# Patient Record
Sex: FEMALE | Race: OTHER | NOT HISPANIC OR LATINO
[De-identification: names, ages, dates, MRNs, and addresses within clinical notes are randomized per-mention and may not be internally consistent; named-entity substitution may affect disease eponyms.]

---

## 2017-01-25 ENCOUNTER — APPOINTMENT (OUTPATIENT)
Dept: HEART AND VASCULAR | Facility: CLINIC | Age: 82
End: 2017-01-25

## 2017-01-25 VITALS — SYSTOLIC BLOOD PRESSURE: 140 MMHG | DIASTOLIC BLOOD PRESSURE: 90 MMHG

## 2017-01-25 VITALS
HEART RATE: 117 BPM | DIASTOLIC BLOOD PRESSURE: 90 MMHG | SYSTOLIC BLOOD PRESSURE: 162 MMHG | WEIGHT: 102.9 LBS | HEIGHT: 60 IN | OXYGEN SATURATION: 94 % | BODY MASS INDEX: 20.2 KG/M2

## 2017-01-25 DIAGNOSIS — I10 ESSENTIAL (PRIMARY) HYPERTENSION: ICD-10-CM

## 2017-04-20 ENCOUNTER — RX RENEWAL (OUTPATIENT)
Age: 82
End: 2017-04-20

## 2017-06-20 ENCOUNTER — APPOINTMENT (OUTPATIENT)
Dept: HEART AND VASCULAR | Facility: CLINIC | Age: 82
End: 2017-06-20

## 2017-06-20 VITALS
WEIGHT: 101 LBS | HEART RATE: 100 BPM | TEMPERATURE: 98.3 F | SYSTOLIC BLOOD PRESSURE: 148 MMHG | BODY MASS INDEX: 19.83 KG/M2 | OXYGEN SATURATION: 94 % | DIASTOLIC BLOOD PRESSURE: 90 MMHG | HEIGHT: 60 IN

## 2017-07-18 ENCOUNTER — RX RENEWAL (OUTPATIENT)
Age: 82
End: 2017-07-18

## 2017-10-18 ENCOUNTER — RX RENEWAL (OUTPATIENT)
Age: 82
End: 2017-10-18

## 2017-12-26 ENCOUNTER — RX RENEWAL (OUTPATIENT)
Age: 82
End: 2017-12-26

## 2018-01-05 ENCOUNTER — APPOINTMENT (OUTPATIENT)
Dept: HEART AND VASCULAR | Facility: CLINIC | Age: 83
End: 2018-01-05

## 2018-02-12 ENCOUNTER — APPOINTMENT (OUTPATIENT)
Dept: HEART AND VASCULAR | Facility: CLINIC | Age: 83
End: 2018-02-12
Payer: MEDICARE

## 2018-02-12 VITALS — SYSTOLIC BLOOD PRESSURE: 140 MMHG | DIASTOLIC BLOOD PRESSURE: 80 MMHG

## 2018-02-12 VITALS
BODY MASS INDEX: 20.03 KG/M2 | HEIGHT: 60 IN | OXYGEN SATURATION: 95 % | DIASTOLIC BLOOD PRESSURE: 90 MMHG | SYSTOLIC BLOOD PRESSURE: 148 MMHG | TEMPERATURE: 98.6 F | WEIGHT: 102 LBS | HEART RATE: 102 BPM

## 2018-02-12 DIAGNOSIS — I48.91 UNSPECIFIED ATRIAL FIBRILLATION: ICD-10-CM

## 2018-02-12 PROCEDURE — 93000 ELECTROCARDIOGRAM COMPLETE: CPT

## 2018-02-12 PROCEDURE — 99214 OFFICE O/P EST MOD 30 MIN: CPT | Mod: 25

## 2018-04-11 ENCOUNTER — RX RENEWAL (OUTPATIENT)
Age: 83
End: 2018-04-11

## 2018-05-25 ENCOUNTER — RX RENEWAL (OUTPATIENT)
Age: 83
End: 2018-05-25

## 2018-07-10 ENCOUNTER — MEDICATION RENEWAL (OUTPATIENT)
Age: 83
End: 2018-07-10

## 2018-07-30 ENCOUNTER — APPOINTMENT (OUTPATIENT)
Dept: HEART AND VASCULAR | Facility: CLINIC | Age: 83
End: 2018-07-30

## 2018-08-09 ENCOUNTER — APPOINTMENT (OUTPATIENT)
Dept: HEART AND VASCULAR | Facility: CLINIC | Age: 83
End: 2018-08-09

## 2018-08-10 ENCOUNTER — RX RENEWAL (OUTPATIENT)
Age: 83
End: 2018-08-10

## 2018-08-29 ENCOUNTER — APPOINTMENT (OUTPATIENT)
Dept: HEART AND VASCULAR | Facility: CLINIC | Age: 83
End: 2018-08-29
Payer: MEDICARE

## 2018-08-29 VITALS
TEMPERATURE: 98.7 F | WEIGHT: 102 LBS | SYSTOLIC BLOOD PRESSURE: 130 MMHG | DIASTOLIC BLOOD PRESSURE: 80 MMHG | HEART RATE: 100 BPM | BODY MASS INDEX: 20.03 KG/M2 | HEIGHT: 60 IN | OXYGEN SATURATION: 96 % | RESPIRATION RATE: 14 BRPM

## 2018-08-29 DIAGNOSIS — I48.92 UNSPECIFIED ATRIAL FLUTTER: ICD-10-CM

## 2018-08-29 PROCEDURE — 99214 OFFICE O/P EST MOD 30 MIN: CPT | Mod: 25

## 2018-08-29 PROCEDURE — 93000 ELECTROCARDIOGRAM COMPLETE: CPT

## 2018-08-30 ENCOUNTER — APPOINTMENT (OUTPATIENT)
Dept: INTERNAL MEDICINE | Facility: CLINIC | Age: 83
End: 2018-08-30

## 2018-09-04 LAB
ALBUMIN SERPL ELPH-MCNC: 4.6 G/DL
ALP BLD-CCNC: 98 U/L
ALT SERPL-CCNC: 15 U/L
ANION GAP SERPL CALC-SCNC: 15 MMOL/L
AST SERPL-CCNC: 26 U/L
BASOPHILS # BLD AUTO: 0.03 K/UL
BASOPHILS NFR BLD AUTO: 0.4 %
BILIRUB SERPL-MCNC: 0.7 MG/DL
BUN SERPL-MCNC: 24 MG/DL
CALCIUM SERPL-MCNC: 9.2 MG/DL
CHLORIDE SERPL-SCNC: 94 MMOL/L
CHOLEST SERPL-MCNC: 186 MG/DL
CHOLEST/HDLC SERPL: 2.3 RATIO
CO2 SERPL-SCNC: 27 MMOL/L
CREAT SERPL-MCNC: 0.75 MG/DL
EOSINOPHIL # BLD AUTO: 0.06 K/UL
EOSINOPHIL NFR BLD AUTO: 0.7 %
GLUCOSE SERPL-MCNC: 73 MG/DL
HBA1C MFR BLD HPLC: 5.5 %
HCT VFR BLD CALC: 41.4 %
HDLC SERPL-MCNC: 80 MG/DL
HGB BLD-MCNC: 13.5 G/DL
IMM GRANULOCYTES NFR BLD AUTO: 0.2 %
LDLC SERPL CALC-MCNC: 94 MG/DL
LYMPHOCYTES # BLD AUTO: 1.95 K/UL
LYMPHOCYTES NFR BLD AUTO: 23.9 %
MAN DIFF?: NORMAL
MCHC RBC-ENTMCNC: 30.8 PG
MCHC RBC-ENTMCNC: 32.6 GM/DL
MCV RBC AUTO: 94.5 FL
MONOCYTES # BLD AUTO: 0.66 K/UL
MONOCYTES NFR BLD AUTO: 8.1 %
NEUTROPHILS # BLD AUTO: 5.44 K/UL
NEUTROPHILS NFR BLD AUTO: 66.7 %
PLATELET # BLD AUTO: 305 K/UL
POTASSIUM SERPL-SCNC: 4.8 MMOL/L
PROT SERPL-MCNC: 7.7 G/DL
RBC # BLD: 4.38 M/UL
RBC # FLD: 15.4 %
SODIUM SERPL-SCNC: 136 MMOL/L
TRIGL SERPL-MCNC: 59 MG/DL
TSH SERPL-ACNC: 1.86 UIU/ML
WBC # FLD AUTO: 8.16 K/UL

## 2018-09-05 ENCOUNTER — RX RENEWAL (OUTPATIENT)
Age: 83
End: 2018-09-05

## 2018-11-08 ENCOUNTER — RX RENEWAL (OUTPATIENT)
Age: 83
End: 2018-11-08

## 2019-01-18 ENCOUNTER — RX RENEWAL (OUTPATIENT)
Age: 84
End: 2019-01-18

## 2019-02-06 ENCOUNTER — RX RENEWAL (OUTPATIENT)
Age: 84
End: 2019-02-06

## 2019-03-18 ENCOUNTER — INPATIENT (INPATIENT)
Facility: HOSPITAL | Age: 84
LOS: 2 days | Discharge: HOME | DRG: 872 | End: 2019-03-21
Payer: MEDICARE

## 2019-03-18 VITALS
HEART RATE: 86 BPM | WEIGHT: 103.62 LBS | DIASTOLIC BLOOD PRESSURE: 69 MMHG | TEMPERATURE: 99 F | OXYGEN SATURATION: 88 % | RESPIRATION RATE: 18 BRPM | SYSTOLIC BLOOD PRESSURE: 132 MMHG

## 2019-03-18 DIAGNOSIS — D72.829 ELEVATED WHITE BLOOD CELL COUNT, UNSPECIFIED: ICD-10-CM

## 2019-03-18 DIAGNOSIS — Z29.9 ENCOUNTER FOR PROPHYLACTIC MEASURES, UNSPECIFIED: ICD-10-CM

## 2019-03-18 DIAGNOSIS — R05 COUGH: ICD-10-CM

## 2019-03-18 DIAGNOSIS — R53.1 WEAKNESS: ICD-10-CM

## 2019-03-18 DIAGNOSIS — R65.10 SYSTEMIC INFLAMMATORY RESPONSE SYNDROME (SIRS) OF NON-INFECTIOUS ORIGIN WITHOUT ACUTE ORGAN DYSFUNCTION: ICD-10-CM

## 2019-03-18 DIAGNOSIS — E87.1 HYPO-OSMOLALITY AND HYPONATREMIA: ICD-10-CM

## 2019-03-18 DIAGNOSIS — I10 ESSENTIAL (PRIMARY) HYPERTENSION: ICD-10-CM

## 2019-03-18 DIAGNOSIS — R63.8 OTHER SYMPTOMS AND SIGNS CONCERNING FOOD AND FLUID INTAKE: ICD-10-CM

## 2019-03-18 DIAGNOSIS — E03.9 HYPOTHYROIDISM, UNSPECIFIED: ICD-10-CM

## 2019-03-18 DIAGNOSIS — I48.91 UNSPECIFIED ATRIAL FIBRILLATION: ICD-10-CM

## 2019-03-18 DIAGNOSIS — J18.9 PNEUMONIA, UNSPECIFIED ORGANISM: ICD-10-CM

## 2019-03-18 LAB
ALBUMIN SERPL ELPH-MCNC: 3.9 G/DL — SIGNIFICANT CHANGE UP (ref 3.3–5)
ALP SERPL-CCNC: 71 U/L — SIGNIFICANT CHANGE UP (ref 40–120)
ALT FLD-CCNC: 14 U/L — SIGNIFICANT CHANGE UP (ref 10–45)
ANION GAP SERPL CALC-SCNC: 13 MMOL/L — SIGNIFICANT CHANGE UP (ref 5–17)
ANISOCYTOSIS BLD QL: SLIGHT — SIGNIFICANT CHANGE UP
APPEARANCE UR: CLEAR — SIGNIFICANT CHANGE UP
APTT BLD: 33.6 SEC — SIGNIFICANT CHANGE UP (ref 27.5–36.3)
AST SERPL-CCNC: 24 U/L — SIGNIFICANT CHANGE UP (ref 10–40)
BACTERIA # UR AUTO: PRESENT /HPF
BASOPHILS # BLD AUTO: 0 K/UL — SIGNIFICANT CHANGE UP (ref 0–0.2)
BASOPHILS NFR BLD AUTO: 0 % — SIGNIFICANT CHANGE UP (ref 0–2)
BILIRUB SERPL-MCNC: 0.5 MG/DL — SIGNIFICANT CHANGE UP (ref 0.2–1.2)
BILIRUB UR-MCNC: NEGATIVE — SIGNIFICANT CHANGE UP
BUN SERPL-MCNC: 24 MG/DL — HIGH (ref 7–23)
BURR CELLS BLD QL SMEAR: SLIGHT — SIGNIFICANT CHANGE UP
CALCIUM SERPL-MCNC: 8.3 MG/DL — LOW (ref 8.4–10.5)
CHLORIDE SERPL-SCNC: 93 MMOL/L — LOW (ref 96–108)
CO2 SERPL-SCNC: 27 MMOL/L — SIGNIFICANT CHANGE UP (ref 22–31)
COLOR SPEC: YELLOW — SIGNIFICANT CHANGE UP
COMMENT - URINE: SIGNIFICANT CHANGE UP
CREAT SERPL-MCNC: 0.9 MG/DL — SIGNIFICANT CHANGE UP (ref 0.5–1.3)
DACRYOCYTES BLD QL SMEAR: SLIGHT — SIGNIFICANT CHANGE UP
DIFF PNL FLD: NEGATIVE — SIGNIFICANT CHANGE UP
EOSINOPHIL # BLD AUTO: 0 K/UL — SIGNIFICANT CHANGE UP (ref 0–0.5)
EOSINOPHIL NFR BLD AUTO: 0 % — SIGNIFICANT CHANGE UP (ref 0–6)
EPI CELLS # UR: SIGNIFICANT CHANGE UP /HPF (ref 0–5)
FLUAV H1 2009 PAND RNA SPEC QL NAA+PROBE: DETECTED
FLUAV SUBTYP SPEC NAA+PROBE: ABNORMAL
GLUCOSE SERPL-MCNC: 136 MG/DL — HIGH (ref 70–99)
GLUCOSE UR QL: NEGATIVE — SIGNIFICANT CHANGE UP
HCT VFR BLD CALC: 39.4 % — SIGNIFICANT CHANGE UP (ref 34.5–45)
HGB BLD-MCNC: 13.5 G/DL — SIGNIFICANT CHANGE UP (ref 11.5–15.5)
HYALINE CASTS # UR AUTO: SIGNIFICANT CHANGE UP /LPF (ref 0–2)
INR BLD: 1.51 — HIGH (ref 0.88–1.16)
KETONES UR-MCNC: ABNORMAL MG/DL
LACTATE SERPL-SCNC: 1.7 MMOL/L — SIGNIFICANT CHANGE UP (ref 0.5–2)
LEGIONELLA AG UR QL: NEGATIVE — SIGNIFICANT CHANGE UP
LEUKOCYTE ESTERASE UR-ACNC: NEGATIVE — SIGNIFICANT CHANGE UP
LYMPHOCYTES # BLD AUTO: 0.2 K/UL — LOW (ref 1–3.3)
LYMPHOCYTES # BLD AUTO: 1.7 % — LOW (ref 13–44)
MACROCYTES BLD QL: SLIGHT — SIGNIFICANT CHANGE UP
MANUAL SMEAR VERIFICATION: SIGNIFICANT CHANGE UP
MCHC RBC-ENTMCNC: 31.3 PG — SIGNIFICANT CHANGE UP (ref 27–34)
MCHC RBC-ENTMCNC: 34.3 GM/DL — SIGNIFICANT CHANGE UP (ref 32–36)
MCV RBC AUTO: 91.4 FL — SIGNIFICANT CHANGE UP (ref 80–100)
MONOCYTES # BLD AUTO: 0.63 K/UL — SIGNIFICANT CHANGE UP (ref 0–0.9)
MONOCYTES NFR BLD AUTO: 5.2 % — SIGNIFICANT CHANGE UP (ref 2–14)
NEUTROPHILS # BLD AUTO: 11.21 K/UL — HIGH (ref 1.8–7.4)
NEUTROPHILS NFR BLD AUTO: 34.8 % — LOW (ref 43–77)
NEUTS BAND # BLD: 58.3 % — HIGH (ref 0–8)
NITRITE UR-MCNC: NEGATIVE — SIGNIFICANT CHANGE UP
OSMOLALITY UR: 473 MOSMOL/KG — SIGNIFICANT CHANGE UP (ref 100–650)
OVALOCYTES BLD QL SMEAR: SLIGHT — SIGNIFICANT CHANGE UP
PH UR: 5.5 — SIGNIFICANT CHANGE UP (ref 5–8)
PLAT MORPH BLD: NORMAL — SIGNIFICANT CHANGE UP
PLATELET # BLD AUTO: 216 K/UL — SIGNIFICANT CHANGE UP (ref 150–400)
POLYCHROMASIA BLD QL SMEAR: SLIGHT — SIGNIFICANT CHANGE UP
POTASSIUM SERPL-MCNC: 3.8 MMOL/L — SIGNIFICANT CHANGE UP (ref 3.5–5.3)
POTASSIUM SERPL-SCNC: 3.8 MMOL/L — SIGNIFICANT CHANGE UP (ref 3.5–5.3)
PROT SERPL-MCNC: 7.6 G/DL — SIGNIFICANT CHANGE UP (ref 6–8.3)
PROT UR-MCNC: 100 MG/DL
PROTHROM AB SERPL-ACNC: 17.3 SEC — HIGH (ref 10–12.9)
RAPID RVP RESULT: DETECTED
RBC # BLD: 4.31 M/UL — SIGNIFICANT CHANGE UP (ref 3.8–5.2)
RBC # FLD: 14.2 % — SIGNIFICANT CHANGE UP (ref 10.3–14.5)
RBC BLD AUTO: ABNORMAL
RBC CASTS # UR COMP ASSIST: < 5 /HPF — SIGNIFICANT CHANGE UP
SODIUM SERPL-SCNC: 133 MMOL/L — LOW (ref 135–145)
SODIUM UR-SCNC: 20 MMOL/L — SIGNIFICANT CHANGE UP
SP GR SPEC: >=1.03 — SIGNIFICANT CHANGE UP (ref 1–1.03)
SPHEROCYTES BLD QL SMEAR: SLIGHT — SIGNIFICANT CHANGE UP
UROBILINOGEN FLD QL: 0.2 E.U./DL — SIGNIFICANT CHANGE UP
WBC # BLD: 12.04 K/UL — HIGH (ref 3.8–10.5)
WBC # FLD AUTO: 12.04 K/UL — HIGH (ref 3.8–10.5)
WBC UR QL: < 5 /HPF — SIGNIFICANT CHANGE UP

## 2019-03-18 PROCEDURE — 93010 ELECTROCARDIOGRAM REPORT: CPT

## 2019-03-18 PROCEDURE — 99285 EMERGENCY DEPT VISIT HI MDM: CPT | Mod: 25

## 2019-03-18 PROCEDURE — 71046 X-RAY EXAM CHEST 2 VIEWS: CPT | Mod: 26

## 2019-03-18 PROCEDURE — 99223 1ST HOSP IP/OBS HIGH 75: CPT | Mod: GC

## 2019-03-18 RX ORDER — AZITHROMYCIN 500 MG/1
500 TABLET, FILM COATED ORAL EVERY 24 HOURS
Qty: 0 | Refills: 0 | Status: DISCONTINUED | OUTPATIENT
Start: 2019-03-18 | End: 2019-03-19

## 2019-03-18 RX ORDER — DILTIAZEM HCL 120 MG
300 CAPSULE, EXT RELEASE 24 HR ORAL DAILY
Qty: 0 | Refills: 0 | Status: DISCONTINUED | OUTPATIENT
Start: 2019-03-18 | End: 2019-03-21

## 2019-03-18 RX ORDER — SODIUM CHLORIDE 9 MG/ML
1450 INJECTION INTRAMUSCULAR; INTRAVENOUS; SUBCUTANEOUS ONCE
Qty: 0 | Refills: 0 | Status: COMPLETED | OUTPATIENT
Start: 2019-03-18 | End: 2019-03-18

## 2019-03-18 RX ORDER — LEVOTHYROXINE SODIUM 125 MCG
125 TABLET ORAL DAILY
Qty: 0 | Refills: 0 | Status: DISCONTINUED | OUTPATIENT
Start: 2019-03-18 | End: 2019-03-21

## 2019-03-18 RX ORDER — RALOXIFENE HYDROCHLORIDE 60 MG/1
60 TABLET, COATED ORAL DAILY
Qty: 0 | Refills: 0 | Status: DISCONTINUED | OUTPATIENT
Start: 2019-03-18 | End: 2019-03-21

## 2019-03-18 RX ORDER — APIXABAN 2.5 MG/1
2.5 TABLET, FILM COATED ORAL EVERY 12 HOURS
Qty: 0 | Refills: 0 | Status: DISCONTINUED | OUTPATIENT
Start: 2019-03-18 | End: 2019-03-21

## 2019-03-18 RX ORDER — ACETAMINOPHEN 500 MG
650 TABLET ORAL ONCE
Qty: 0 | Refills: 0 | Status: COMPLETED | OUTPATIENT
Start: 2019-03-18 | End: 2019-03-18

## 2019-03-18 RX ORDER — CEFTRIAXONE 500 MG/1
1 INJECTION, POWDER, FOR SOLUTION INTRAMUSCULAR; INTRAVENOUS EVERY 24 HOURS
Qty: 0 | Refills: 0 | Status: DISCONTINUED | OUTPATIENT
Start: 2019-03-18 | End: 2019-03-19

## 2019-03-18 RX ADMIN — AZITHROMYCIN 255 MILLIGRAM(S): 500 TABLET, FILM COATED ORAL at 19:17

## 2019-03-18 RX ADMIN — APIXABAN 2.5 MILLIGRAM(S): 2.5 TABLET, FILM COATED ORAL at 18:57

## 2019-03-18 RX ADMIN — Medication 30 MILLIGRAM(S): at 22:22

## 2019-03-18 RX ADMIN — CEFTRIAXONE 100 GRAM(S): 500 INJECTION, POWDER, FOR SOLUTION INTRAMUSCULAR; INTRAVENOUS at 18:29

## 2019-03-18 RX ADMIN — SODIUM CHLORIDE 1450 MILLILITER(S): 9 INJECTION INTRAMUSCULAR; INTRAVENOUS; SUBCUTANEOUS at 17:05

## 2019-03-18 RX ADMIN — Medication 650 MILLIGRAM(S): at 16:27

## 2019-03-18 NOTE — ED ADULT TRIAGE NOTE - CHIEF COMPLAINT QUOTE
Patient, speaking in full sentences, complaining of decreased PO intake for two days, cough and generalized weakness.  Patient denies any CP, SOB, dizziness, N/V/D or any other complaints at this time.   Patient noted to be 88% O2 on RA, EKG in progress.

## 2019-03-18 NOTE — ED ADULT NURSE NOTE - NSIMPLEMENTINTERV_GEN_ALL_ED
Implemented All Fall with Harm Risk Interventions:  [place] to call system. Call bell, personal items and telephone within reach. Instruct patient to call for assistance. Room bathroom lighting operational. Non-slip footwear when patient is off stretcher. Physically safe environment: no spills, clutter or unnecessary equipment. Stretcher in lowest position, wheels locked, appropriate side rails in place. Provide visual cue, wrist band, yellow gown, etc. Monitor gait and stability. Monitor for mental status changes and reorient to person, place, and time. Review medications for side effects contributing to fall risk. Reinforce activity limits and safety measures with patient and family. Provide visual clues: red socks.

## 2019-03-18 NOTE — H&P ADULT - NSICDXPASTSURGICALHX_GEN_ALL_CORE_FT
PAST SURGICAL HISTORY:  History of total hip replacement S/P hip replacement    Other postprocedural status S/P thyroidectomy

## 2019-03-18 NOTE — ED PROVIDER NOTE - PHYSICAL EXAMINATION
VITAL SIGNS: I have reviewed nursing notes and confirm.  CONSTITUTIONAL: Well-developed; well-nourished; in no acute distress. NC in place  SKIN: Skin is warm and dry, no acute rash.  HEAD: Normocephalic; atraumatic.  EYES:  EOM intact; conjunctiva and sclera clear.  ENT: No nasal discharge; airway clear.  NECK: Supple; Voluntary FROM  CARD: No rubs appreciated, Regular rate and rhythm.  RESP: No wheezes, no rales. No respiratory distress  ABD: Soft; non-distended; non-tender; no rebound or guarding  EXT: Normal ROM. No cyanosis or edema.  NEURO: Alert, oriented. Grossly unremarkable.  PSYCH: Cooperative, appropriate.

## 2019-03-18 NOTE — H&P ADULT - NSHPPHYSICALEXAM_GEN_ALL_CORE
.  VITAL SIGNS:  T(C): 36.7 (03-18-19 @ 17:23), Max: 38.7 (03-18-19 @ 15:51)  T(F): 98.1 (03-18-19 @ 17:23), Max: 101.7 (03-18-19 @ 15:51)  HR: 98 (03-18-19 @ 17:23) (82 - 98)  BP: 134/72 (03-18-19 @ 17:23) (112/65 - 134/72)  BP(mean): --  RR: 22 (03-18-19 @ 17:23) (18 - 22)  SpO2: 98% (03-18-19 @ 17:23) (88% - 98%)  Wt(kg): --    PHYSICAL EXAM:    Constitutional: WDWN elderly female , resting comfortably in bed; NAD  Head: NC/AT  Eyes: PERRL, EOMI, anicteric sclera  ENT: no nasal discharge; uvula midline, no oropharyngeal erythema or exudates; MMM  Neck: supple; no JVD or thyromegaly  Respiratory: mild ronchi on the LLL, no wheezing  Cardiac: +S1/S2; RRR; no M/R/G; PMI non-displaced  Gastrointestinal: abdomen soft, NT/ND; no rebound or guarding; +BSx4  Extremities: WWP, no clubbing or cyanosis; no peripheral edema  Vascular: 2+ radial, femoral, DP/PT pulses B/L  Dermatologic: skin warm, dry and intact; no rashes, wounds, or scars  Neurologic: AAOx3; CNII-XII grossly intact; no focal deficits  Psychiatric: affect and characteristics of appearance, verbalizations, behaviors are appropriate

## 2019-03-18 NOTE — ED ADULT NURSE NOTE - PMH
Atrial fibrillation  Atrial fibrillation  Essential hypertension  HTN (hypertension)  Malignant neoplasm of thyroid gland  Thyroid ca

## 2019-03-18 NOTE — H&P ADULT - ASSESSMENT
Pt is a 89 y/o F hx of  Afib (on Eliquis), CAD, HTN, asthma and hypothyroid p/w cough and weakness x 2 days admitted to [facility] for PNA and being treated for CAP. Pt is a 89 y/o F hx of  Afib (on Eliquis), CAD, HTN, asthma and hypothyroid p/w cough and weakness x 2 days admitted to [facility] due to sepsis 2/2 PNA and being treated for CAP. Pt is a 89 y/o F hx of  Afib (on Eliquis), CAD, HTN, asthma and hypothyroid p/w cough and weakness x 2 days admitted to [facility] due to sepsis 2/2 PNA

## 2019-03-18 NOTE — H&P ADULT - NSHPLABSRESULTS_GEN_ALL_CORE
.  LABS:                         13.5   12.04 )-----------( 216      ( 18 Mar 2019 15:29 )             39.4     03-18    133<L>  |  93<L>  |  24<H>  ----------------------------<  136<H>  3.8   |  27  |  0.90    Ca    8.3<L>      18 Mar 2019 15:29    TPro  7.6  /  Alb  3.9  /  TBili  0.5  /  DBili  x   /  AST  24  /  ALT  14  /  AlkPhos  71  03-18    PT/INR - ( 18 Mar 2019 15:29 )   PT: 17.3 sec;   INR: 1.51          PTT - ( 18 Mar 2019 15:29 )  PTT:33.6 sec    CARDIAC MARKERS ( 18 Mar 2019 15:29 )  x     / <0.01 ng/mL / x     / x     / x            Lactate, Blood: 1.7 mmoL/L (03-18 @ 15:29)      RADIOLOGY, EKG & ADDITIONAL TESTS: Reviewed.

## 2019-03-18 NOTE — H&P ADULT - NSICDXPASTMEDICALHX_GEN_ALL_CORE_FT
PAST MEDICAL HISTORY:  Atrial fibrillation Atrial fibrillation    Essential hypertension HTN (hypertension)    Malignant neoplasm of thyroid gland Thyroid ca

## 2019-03-18 NOTE — ED ADULT NURSE NOTE - OBJECTIVE STATEMENT
Patient endorses for the past two days patient has generalized weakness and nonproductive cough, patient denies chest pain, nausea, vomiting or diarrhea.

## 2019-03-18 NOTE — H&P ADULT - NSHPREVIEWOFSYSTEMS_GEN_ALL_CORE
REVIEW OF SYSTEMS:    CONSTITUTIONAL: + weakness, fevers and chills  EYES/ENT: No visual changes;  No vertigo or throat pain   NECK: No pain or stiffness  RESPIRATORY: + cough, no wheezing, hemoptysis; + shortness of breath  CARDIOVASCULAR: No chest pain or palpitations  GASTROINTESTINAL: No abdominal or epigastric pain. No nausea, vomiting, or hematemesis; No diarrhea or constipation. No melena or hematochezia.  GENITOURINARY: No dysuria, frequency or hematuria  NEUROLOGICAL: No numbness or weakness  SKIN: No itching, burning, rashes, or lesions   All other review of systems is negative unless indicated above.

## 2019-03-18 NOTE — H&P ADULT - ATTENDING COMMENTS
a/  fever /cough -- no definite infiltrate appreciated on cxr. scant rales at right base on exam.   r/o viral syndrome  sepsis  CAD  afib  htn  chronic asthma  hyponatremia    p/  check RVP + urine legionella  s/p levoflox in ED  can start ceftriax/azithro if viral studies return negative  ivfs  trend na levels  cont apixiban  nebs prn  cont enalipril  cont synthroid

## 2019-03-18 NOTE — H&P ADULT - HISTORY OF PRESENT ILLNESS
Pt is a 89 y/o F hx of  Afib (on Eliquis), CAD, HTN, asthma and hypothyroid p/w cough and weakness x 2 days. Pt was in her usual state of health on Saturday (316/19) and woke up the next morning with cough , fatigue, and chills. Pt states she has intermittent productive cough with yellow sputum. Pt also reports feeling weak and having decreased appetite. She denies any incident of falling. Pt denies any sick contacts or recent travel history. Reports that she has received the pneumonia vaccine and flu shot this year. Pt endorses mild shortness of breathe but denies any CP, Palpitations, N/V, Diarrhea, urinary frequency, dysuria, abdominal pain, melena, hematochezia, or hemoptysis.     In the ED vitals: rectal T 101.7, HR 82, /65, RR 18, 96% on supplemental O2. CXR with no obvious signs of infiltrate but pending official read. Pt received 1.45L NS bolus, tylenol and Levaquin. Pt was admitted to [facility] due to suspected PNA. Pt is a 89 y/o F hx of  Afib (on Eliquis), CAD, HTN, asthma and hypothyroid p/w cough and weakness x 2 days. Pt was in her usual state of health on Saturday (316/19) and woke up the next morning with cough , fatigue, and chills. Pt states she has intermittent productive cough with yellow sputum. Pt also reports feeling weak and having decreased appetite. She denies any incident of falling. Pt denies any sick contacts or recent travel history. Reports that she has received the pneumonia vaccine and flu shot this year. Pt endorses mild shortness of breathe but denies any CP, Palpitations, N/V, Diarrhea, urinary frequency, dysuria, abdominal pain, melena, hematochezia, or hemoptysis.     In the ED vitals: rectal T 101.7, HR 82, /65, RR 18, 96% on supplemental O2. CXR with possible haziness on RLL but pending official read. Pt received 1.45L NS bolus, tylenol and Levaquin. Pt was admitted to [facility] due to sepsis 2/2 suspected PNA.

## 2019-03-18 NOTE — ED PROVIDER NOTE - OBJECTIVE STATEMENT
89F pmh afib, CAD, HTN, hypothyroid p/w generalized fatigue, weakness, cough for past 2 days, with fever, chills at home. No cp, no sob, no n/v, no abd pain, no diarrhea. No unilateral leg swelling. Taking all medications as directed.   Was well saturday night.

## 2019-03-18 NOTE — H&P ADULT - NSICDXPROBLEM_GEN_ALL_CORE_FT
PROBLEM DIAGNOSES  Problem: Systemic inflammatory response syndrome (SIRS)  Assessment and Plan: Pt presenting with T of 101.7 rectal and Leukocytosis and 58% bands with no source of infection at this time. Possibly 2/2 to PNA vs URI vs UTI(no obvious source at the time). Most likely PNA due to pt's history of cough.   - s/p 1.45 L NS bolus in ED   - start Ceftriaxone 1g Q24H and Azithromycin 500mg Q24H for CAP  - f/u UA and Urine cx  - f/u RVP  - f/u Blood cx  - CXR with no signs of infiltrate  - trend WBC     Problem: Hyponatremia  Assessment and Plan: Pt with hyponatremia of 133 on admission possibly 2/2 to dehydration in the setting of  poor PO intake  - f/u serum osmolality  - f/u urine lytes     Problem: Cough  Assessment and Plan: Pt with cough weakness and chills, found to be febrile likely in the setting of PNA. CXR with no obvious conslidation, pending official read.   - treatment as per above     Problem: General weakness  Assessment and Plan: Weakness in the setting of infection, dehydration, and poor PO intake. Pt is mentating well and has 5/5 strength in UE and LE b/l.   - treatment for PNA as per above      Problem: Leukocytosis  Assessment and Plan:     Problem: Atrial fibrillation  Assessment and Plan: Pt with a hx of Afib  - c/w Elqiuis 2.5 mg BID   - pt follows with Dr. Mosqueda (cardiology)     Problem: Essential hypertension  Assessment and Plan: Pt with a hx of HTN; currently normotensive   - c/w Enalapril 10mg     Problem: Hypothyroid  Assessment and Plan: Pt with hx of thyroid malignancy and now hypothyroid.  - c/w Synthroid .125 mg QD     Problem: Prophylactic measure  Assessment and Plan:     Problem: Nutrition, metabolism, and development symptoms  Assessment and Plan: F; no IVF for now   E K > 4 Mg > 2  N: DASH diet PROBLEM DIAGNOSES  Problem: Sepsis due to pneumonia  Assessment and Plan: Pt presenting with T of 101.7 rectal and Leukocytosis and 58% bands with possibe developing RLL infiltrate.   - s/p 1.45 L NS bolus in ED   - start Ceftriaxone 1g Q24H and Azithromycin 500mg Q24H for CAP  -- f/u RVP  - f/u UA and Urine cx  - f/u Blood cx  - CXR with no signs of infiltrate  - trend WBC     Problem: Hyponatremia  Assessment and Plan: Pt with hyponatremia of 133 on admission possibly 2/2 to dehydration in the setting of  poor PO intake  - f/u serum osmolality  - f/u urine lytes     Problem: Cough  Assessment and Plan: Pt with cough weakness and chills, found to be febrile likely in the setting of PNA. CXR with no obvious conslidation, pending official read.   - treatment as per above     Problem: General weakness  Assessment and Plan: Weakness in the setting of infection, dehydration, and poor PO intake. Pt is mentating well and has 5/5 strength in UE and LE b/l.   - treatment for PNA as per above      Problem: Leukocytosis  Assessment and Plan:     Problem: Atrial fibrillation  Assessment and Plan: Pt with a hx of Afib  - c/w Elqiuis 2.5 mg BID   - pt follows with Dr. Mosqueda (cardiology)     Problem: Essential hypertension  Assessment and Plan: Pt with a hx of HTN; currently normotensive   - c/w Enalapril 10mg     Problem: Hypothyroid  Assessment and Plan: Pt with hx of thyroid malignancy and now hypothyroid.  - c/w Synthroid .125 mg QD     Problem: Prophylactic measure  Assessment and Plan:     Problem: Nutrition, metabolism, and development symptoms  Assessment and Plan: F; no IVF for now   E K > 4 Mg > 2  N: DASH diet PROBLEM DIAGNOSES  Problem: Sepsis due to pneumonia  Assessment and Plan: Pt presenting with T of 101.7 rectal and Leukocytosis and 58% bands with possibe developing RLL infiltrate.   - s/p 1.45 L NS bolus in ED   - hold off on abx until viral studies are back   -- f/u RVP  - f/u UA and Urine cx  - f/u Blood cx  - CXR with no signs of infiltrate  - trend WBC     Problem: Hyponatremia  Assessment and Plan: Pt with hyponatremia of 133 on admission possibly 2/2 to dehydration in the setting of  poor PO intake  - f/u serum osmolality  - f/u urine lytes     Problem: Cough  Assessment and Plan: Pt with cough weakness and chills, found to be febrile likely in the setting of PNA. CXR with no obvious conslidation, pending official read.   - treatment as per above     Problem: General weakness  Assessment and Plan: Weakness in the setting of infection, dehydration, and poor PO intake. Pt is mentating well and has 5/5 strength in UE and LE b/l.   - treatment for PNA as per above      Problem: Leukocytosis  Assessment and Plan:     Problem: Atrial fibrillation  Assessment and Plan: Pt with a hx of Afib  - c/w Elqiuis 2.5 mg BID   - pt follows with Dr. Mosqueda (cardiology)     Problem: Essential hypertension  Assessment and Plan: Pt with a hx of HTN; currently normotensive   - c/w Enalapril 10mg     Problem: Hypothyroid  Assessment and Plan: Pt with hx of thyroid malignancy and now hypothyroid.  - c/w Synthroid .125 mg QD     Problem: Prophylactic measure  Assessment and Plan:     Problem: Nutrition, metabolism, and development symptoms  Assessment and Plan: F; no IVF for now   E K > 4 Mg > 2  N: DASH diet

## 2019-03-18 NOTE — ED PROVIDER NOTE - CLINICAL SUMMARY MEDICAL DECISION MAKING FREE TEXT BOX
89F pmh afib, CAD, HTN, hypothyroid p/w generalized fatigue, weakness, cough for past 2 days, with fever, chills at home. No cp, no sob, no n/v, no abd pain, no diarrhea. No unilateral leg swelling. Concern pna, hypoxia, sepsis, dehdyration, less likely acs or pe. EKG not acutely ischemic. luekocytosis and bandemia noted. abx ordered, IVF,

## 2019-03-18 NOTE — ED ADULT NURSE NOTE - PSH
History of total hip replacement  S/P hip replacement  Other postprocedural status  S/P thyroidectomy

## 2019-03-19 DIAGNOSIS — J10.1 INFLUENZA DUE TO OTHER IDENTIFIED INFLUENZA VIRUS WITH OTHER RESPIRATORY MANIFESTATIONS: ICD-10-CM

## 2019-03-19 DIAGNOSIS — A41.9 SEPSIS, UNSPECIFIED ORGANISM: ICD-10-CM

## 2019-03-19 DIAGNOSIS — Z91.89 OTHER SPECIFIED PERSONAL RISK FACTORS, NOT ELSEWHERE CLASSIFIED: ICD-10-CM

## 2019-03-19 LAB
ANION GAP SERPL CALC-SCNC: 9 MMOL/L — SIGNIFICANT CHANGE UP (ref 5–17)
BUN SERPL-MCNC: 25 MG/DL — HIGH (ref 7–23)
CALCIUM SERPL-MCNC: 7.8 MG/DL — LOW (ref 8.4–10.5)
CHLORIDE SERPL-SCNC: 96 MMOL/L — SIGNIFICANT CHANGE UP (ref 96–108)
CO2 SERPL-SCNC: 27 MMOL/L — SIGNIFICANT CHANGE UP (ref 22–31)
CREAT SERPL-MCNC: 0.74 MG/DL — SIGNIFICANT CHANGE UP (ref 0.5–1.3)
GLUCOSE SERPL-MCNC: 140 MG/DL — HIGH (ref 70–99)
HCT VFR BLD CALC: 38 % — SIGNIFICANT CHANGE UP (ref 34.5–45)
HGB BLD-MCNC: 12.2 G/DL — SIGNIFICANT CHANGE UP (ref 11.5–15.5)
MAGNESIUM SERPL-MCNC: 1.8 MG/DL — SIGNIFICANT CHANGE UP (ref 1.6–2.6)
MCHC RBC-ENTMCNC: 30 PG — SIGNIFICANT CHANGE UP (ref 27–34)
MCHC RBC-ENTMCNC: 32.1 GM/DL — SIGNIFICANT CHANGE UP (ref 32–36)
MCV RBC AUTO: 93.6 FL — SIGNIFICANT CHANGE UP (ref 80–100)
NRBC # BLD: 0 /100 WBCS — SIGNIFICANT CHANGE UP (ref 0–0)
OSMOLALITY SERPL: 279 MOSM/KG — LOW (ref 280–301)
PHOSPHATE SERPL-MCNC: 3.5 MG/DL — SIGNIFICANT CHANGE UP (ref 2.5–4.5)
PLATELET # BLD AUTO: 194 K/UL — SIGNIFICANT CHANGE UP (ref 150–400)
POTASSIUM SERPL-MCNC: 3.6 MMOL/L — SIGNIFICANT CHANGE UP (ref 3.5–5.3)
POTASSIUM SERPL-SCNC: 3.6 MMOL/L — SIGNIFICANT CHANGE UP (ref 3.5–5.3)
RBC # BLD: 4.06 M/UL — SIGNIFICANT CHANGE UP (ref 3.8–5.2)
RBC # FLD: 14.6 % — HIGH (ref 10.3–14.5)
SODIUM SERPL-SCNC: 132 MMOL/L — LOW (ref 135–145)
WBC # BLD: 10.51 K/UL — HIGH (ref 3.8–10.5)
WBC # FLD AUTO: 10.51 K/UL — HIGH (ref 3.8–10.5)

## 2019-03-19 PROCEDURE — 99233 SBSQ HOSP IP/OBS HIGH 50: CPT | Mod: GC

## 2019-03-19 PROCEDURE — 71045 X-RAY EXAM CHEST 1 VIEW: CPT | Mod: 26

## 2019-03-19 RX ORDER — ACETYLCYSTEINE 200 MG/ML
4 VIAL (ML) MISCELLANEOUS EVERY 8 HOURS
Qty: 0 | Refills: 0 | Status: DISCONTINUED | OUTPATIENT
Start: 2019-03-19 | End: 2019-03-19

## 2019-03-19 RX ORDER — IPRATROPIUM/ALBUTEROL SULFATE 18-103MCG
3 AEROSOL WITH ADAPTER (GRAM) INHALATION EVERY 4 HOURS
Qty: 0 | Refills: 0 | Status: DISCONTINUED | OUTPATIENT
Start: 2019-03-19 | End: 2019-03-21

## 2019-03-19 RX ORDER — BENZOCAINE AND MENTHOL 5; 1 G/100ML; G/100ML
1 LIQUID ORAL EVERY 6 HOURS
Qty: 0 | Refills: 0 | Status: DISCONTINUED | OUTPATIENT
Start: 2019-03-19 | End: 2019-03-21

## 2019-03-19 RX ORDER — IPRATROPIUM/ALBUTEROL SULFATE 18-103MCG
3 AEROSOL WITH ADAPTER (GRAM) INHALATION ONCE
Qty: 0 | Refills: 0 | Status: COMPLETED | OUTPATIENT
Start: 2019-03-19 | End: 2019-03-19

## 2019-03-19 RX ORDER — SODIUM CHLORIDE 9 MG/ML
250 INJECTION INTRAMUSCULAR; INTRAVENOUS; SUBCUTANEOUS ONCE
Qty: 0 | Refills: 0 | Status: COMPLETED | OUTPATIENT
Start: 2019-03-19 | End: 2019-03-19

## 2019-03-19 RX ORDER — ACETAMINOPHEN 500 MG
650 TABLET ORAL EVERY 6 HOURS
Qty: 0 | Refills: 0 | Status: DISCONTINUED | OUTPATIENT
Start: 2019-03-19 | End: 2019-03-21

## 2019-03-19 RX ORDER — POTASSIUM CHLORIDE 20 MEQ
40 PACKET (EA) ORAL ONCE
Qty: 0 | Refills: 0 | Status: COMPLETED | OUTPATIENT
Start: 2019-03-19 | End: 2019-03-19

## 2019-03-19 RX ORDER — MAGNESIUM SULFATE 500 MG/ML
2 VIAL (ML) INJECTION ONCE
Qty: 0 | Refills: 0 | Status: COMPLETED | OUTPATIENT
Start: 2019-03-19 | End: 2019-03-19

## 2019-03-19 RX ADMIN — Medication 50 GRAM(S): at 13:38

## 2019-03-19 RX ADMIN — Medication 650 MILLIGRAM(S): at 03:41

## 2019-03-19 RX ADMIN — Medication 3 MILLILITER(S): at 02:21

## 2019-03-19 RX ADMIN — BENZOCAINE AND MENTHOL 1 LOZENGE: 5; 1 LIQUID ORAL at 13:37

## 2019-03-19 RX ADMIN — APIXABAN 2.5 MILLIGRAM(S): 2.5 TABLET, FILM COATED ORAL at 17:36

## 2019-03-19 RX ADMIN — Medication 40 MILLIEQUIVALENT(S): at 13:38

## 2019-03-19 RX ADMIN — Medication 650 MILLIGRAM(S): at 17:37

## 2019-03-19 RX ADMIN — RALOXIFENE HYDROCHLORIDE 60 MILLIGRAM(S): 60 TABLET, COATED ORAL at 11:15

## 2019-03-19 RX ADMIN — Medication 3 MILLILITER(S): at 17:37

## 2019-03-19 RX ADMIN — Medication 300 MILLIGRAM(S): at 06:38

## 2019-03-19 RX ADMIN — Medication 30 MILLIGRAM(S): at 21:41

## 2019-03-19 RX ADMIN — Medication 3 MILLILITER(S): at 03:44

## 2019-03-19 RX ADMIN — Medication 4 MILLILITER(S): at 06:38

## 2019-03-19 RX ADMIN — Medication 3 MILLILITER(S): at 06:39

## 2019-03-19 RX ADMIN — Medication 10 MILLIGRAM(S): at 06:39

## 2019-03-19 RX ADMIN — Medication 125 MICROGRAM(S): at 06:39

## 2019-03-19 RX ADMIN — Medication 3 MILLILITER(S): at 00:41

## 2019-03-19 RX ADMIN — Medication 3 MILLILITER(S): at 11:14

## 2019-03-19 RX ADMIN — APIXABAN 2.5 MILLIGRAM(S): 2.5 TABLET, FILM COATED ORAL at 07:28

## 2019-03-19 RX ADMIN — Medication 3 MILLILITER(S): at 21:41

## 2019-03-19 RX ADMIN — BENZOCAINE AND MENTHOL 1 LOZENGE: 5; 1 LIQUID ORAL at 17:37

## 2019-03-19 NOTE — PROGRESS NOTE ADULT - SUBJECTIVE AND OBJECTIVE BOX
SUBJECTIVE / INTERVAL HPI: Patient reports feeling short of breath during the night and was placed on supplemental O2. Patient seen and examined at bedside.     VITAL SIGNS:  Vital Signs Last 24 Hrs  T(C): 37.1 (19 Mar 2019 09:09), Max: 38.7 (18 Mar 2019 15:51)  T(F): 98.7 (19 Mar 2019 09:09), Max: 101.7 (18 Mar 2019 15:51)  HR: 84 (19 Mar 2019 09:09) (72 - 107)  BP: 106/64 (19 Mar 2019 09:09) (106/64 - 151/77)  BP(mean): --  RR: 18 (19 Mar 2019 09:09) (18 - 22)  SpO2: 93% (19 Mar 2019 09:09) (88% - 98%)    PHYSICAL EXAM:    General: well appearing, resting comfortably in bed and in no acute distress. Able to speak in full sentences but appears more winded with prolonged conversation  HEENT: normocephalic, atraumatic, PERRL, anicteric sclera; no conjunctival pallor, mucus membranes moist  Neck: supple, no masses  Cardiovascular: +S1/S2, regular rate and rhythm; no murmurs, no rub, no gallop  Respiratory: Upper airway sounds that clear with cough,  no wheeze, no rales  Gastrointestinal: soft, active bowel sounds, non-tender, non-distended  Extremities: Warm, dry; no edema, clubbing or cyanosis  Vascular: 2+ radial, DP pulses bilaterally  Neurological: AAOx3; no focal deficits    MEDICATIONS:  MEDICATIONS  (STANDING):  acetylcysteine 20%  Inhalation 4 milliLiter(s) Inhalation every 8 hours  ALBUTerol/ipratropium for Nebulization 3 milliLiter(s) Nebulizer every 4 hours  apixaban 2.5 milliGRAM(s) Oral every 12 hours  diltiazem    milliGRAM(s) Oral daily  enalapril 10 milliGRAM(s) Oral daily  levothyroxine 125 MICROGram(s) Oral daily  oseltamivir 30 milliGRAM(s) Oral every 24 hours  raloxifene 60 milliGRAM(s) Oral daily    MEDICATIONS  (PRN):  acetaminophen   Tablet .. 650 milliGRAM(s) Oral every 6 hours PRN Temp greater or equal to 38C (100.4F)      ALLERGIES:  Allergies    No Known Allergies    Intolerances        LABS:                        12.2   10.51 )-----------( 194      ( 19 Mar 2019 06:35 )             38.0     03-19    132<L>  |  96  |  25<H>  ----------------------------<  140<H>  3.6   |  27  |  0.74    Ca    7.8<L>      19 Mar 2019 06:35  Phos  3.5     03-19  Mg     1.8     03-19    TPro  7.6  /  Alb  3.9  /  TBili  0.5  /  DBili  x   /  AST  24  /  ALT  14  /  AlkPhos  71  03-18    PT/INR - ( 18 Mar 2019 15:29 )   PT: 17.3 sec;   INR: 1.51          PTT - ( 18 Mar 2019 15:29 )  PTT:33.6 sec  Urinalysis Basic - ( 18 Mar 2019 20:05 )    Color: Yellow / Appearance: Clear / SG: >=1.030 / pH: x  Gluc: x / Ketone: Trace mg/dL  / Bili: Negative / Urobili: 0.2 E.U./dL   Blood: x / Protein: 100 mg/dL / Nitrite: NEGATIVE   Leuk Esterase: NEGATIVE / RBC: < 5 /HPF / WBC < 5 /HPF   Sq Epi: x / Non Sq Epi: 0-5 /HPF / Bacteria: Present /HPF      CAPILLARY BLOOD GLUCOSE          RADIOLOGY & ADDITIONAL TESTS: Reviewed. SUBJECTIVE / INTERVAL HPI: Patient reports feeling short of breath during the night and was placed on supplemental O2. Patient seen and examined at bedside.     VITAL SIGNS:  Vital Signs Last 24 Hrs  T(C): 37.1 (19 Mar 2019 09:09), Max: 38.7 (18 Mar 2019 15:51)  T(F): 98.7 (19 Mar 2019 09:09), Max: 101.7 (18 Mar 2019 15:51)  HR: 84 (19 Mar 2019 09:09) (72 - 107)  BP: 106/64 (19 Mar 2019 09:09) (106/64 - 151/77)  BP(mean): --  RR: 18 (19 Mar 2019 09:09) (18 - 22)  SpO2: 93% (19 Mar 2019 09:09) (88% - 98%)    PHYSICAL EXAM:    General: well appearing, resting comfortably in bed and in no acute distress. Able to speak in full sentences but appears more winded with prolonged conversation  HEENT: normocephalic, atraumatic, PERRL, anicteric sclera; no conjunctival pallor, mucus membranes moist  Neck: supple, no masses  Cardiovascular: +S1/S2, regular rate and rhythm; no murmurs, no rub, no gallop  Respiratory: Upper airway sounds that clear with cough, mild rhonchi diffusely no wheeze, no rales  Gastrointestinal: soft, active bowel sounds, non-tender, non-distended  Extremities: Warm, dry; no edema, clubbing or cyanosis  Vascular: 2+ radial, DP pulses bilaterally  Neurological: AAOx3; no focal deficits    MEDICATIONS:  MEDICATIONS  (STANDING):  acetylcysteine 20%  Inhalation 4 milliLiter(s) Inhalation every 8 hours  ALBUTerol/ipratropium for Nebulization 3 milliLiter(s) Nebulizer every 4 hours  apixaban 2.5 milliGRAM(s) Oral every 12 hours  diltiazem    milliGRAM(s) Oral daily  enalapril 10 milliGRAM(s) Oral daily  levothyroxine 125 MICROGram(s) Oral daily  oseltamivir 30 milliGRAM(s) Oral every 24 hours  raloxifene 60 milliGRAM(s) Oral daily    MEDICATIONS  (PRN):  acetaminophen   Tablet .. 650 milliGRAM(s) Oral every 6 hours PRN Temp greater or equal to 38C (100.4F)      ALLERGIES:  Allergies    No Known Allergies    Intolerances        LABS:                        12.2   10.51 )-----------( 194      ( 19 Mar 2019 06:35 )             38.0     03-19    132<L>  |  96  |  25<H>  ----------------------------<  140<H>  3.6   |  27  |  0.74    Ca    7.8<L>      19 Mar 2019 06:35  Phos  3.5     03-19  Mg     1.8     03-19    TPro  7.6  /  Alb  3.9  /  TBili  0.5  /  DBili  x   /  AST  24  /  ALT  14  /  AlkPhos  71  03-18    PT/INR - ( 18 Mar 2019 15:29 )   PT: 17.3 sec;   INR: 1.51          PTT - ( 18 Mar 2019 15:29 )  PTT:33.6 sec  Urinalysis Basic - ( 18 Mar 2019 20:05 )    Color: Yellow / Appearance: Clear / SG: >=1.030 / pH: x  Gluc: x / Ketone: Trace mg/dL  / Bili: Negative / Urobili: 0.2 E.U./dL   Blood: x / Protein: 100 mg/dL / Nitrite: NEGATIVE   Leuk Esterase: NEGATIVE / RBC: < 5 /HPF / WBC < 5 /HPF   Sq Epi: x / Non Sq Epi: 0-5 /HPF / Bacteria: Present /HPF      CAPILLARY BLOOD GLUCOSE          RADIOLOGY & ADDITIONAL TESTS: Reviewed. Off Service Note:  Hospital Course:     SUBJECTIVE / INTERVAL HPI: Patient reports feeling short of breath during the night and was placed on supplemental O2. Patient seen and examined at bedside. Patient reports feeling like she is able to breathe better on O2 but notes some sore throat. Cough is not productive at this time. Otherwise: (-) fever, (-) chills, (-) dizziness, (-) headache, (-) neck pain, (-) chest pain, (-) palpitations, (-)SOB, (-) nausea, (-) vomiting, (-) diarrhea, (-) abdominal pain, (-)new weakness, (-) paresthesias.        VITAL SIGNS:  Vital Signs Last 24 Hrs  T(C): 37.1 (19 Mar 2019 09:09), Max: 38.7 (18 Mar 2019 15:51)  T(F): 98.7 (19 Mar 2019 09:09), Max: 101.7 (18 Mar 2019 15:51)  HR: 84 (19 Mar 2019 09:09) (72 - 107)  BP: 106/64 (19 Mar 2019 09:09) (106/64 - 151/77)  BP(mean): --  RR: 18 (19 Mar 2019 09:09) (18 - 22)  SpO2: 93% (19 Mar 2019 09:09) (88% - 98%)    PHYSICAL EXAM:    General: well appearing, resting comfortably in bed and in no acute distress. Able to speak in full sentences but appears more winded with prolonged conversation  HEENT: normocephalic, atraumatic, PERRL, anicteric sclera; no conjunctival pallor, mucus membranes moist  Neck: supple, no masses  Cardiovascular: +S1/S2, regular rate and rhythm; no murmurs, no rub, no gallop  Respiratory: Upper airway sounds that clear with cough, mild rhonchi diffusely no wheeze, no rales  Gastrointestinal: soft, active bowel sounds, non-tender, non-distended  Extremities: Warm, dry; no edema, clubbing or cyanosis  Vascular: 2+ radial, DP pulses bilaterally  Neurological: AAOx3; no focal deficits    MEDICATIONS:  MEDICATIONS  (STANDING):  acetylcysteine 20%  Inhalation 4 milliLiter(s) Inhalation every 8 hours  ALBUTerol/ipratropium for Nebulization 3 milliLiter(s) Nebulizer every 4 hours  apixaban 2.5 milliGRAM(s) Oral every 12 hours  diltiazem    milliGRAM(s) Oral daily  enalapril 10 milliGRAM(s) Oral daily  levothyroxine 125 MICROGram(s) Oral daily  oseltamivir 30 milliGRAM(s) Oral every 24 hours  raloxifene 60 milliGRAM(s) Oral daily    MEDICATIONS  (PRN):  acetaminophen   Tablet .. 650 milliGRAM(s) Oral every 6 hours PRN Temp greater or equal to 38C (100.4F)      ALLERGIES:  Allergies    No Known Allergies    Intolerances        LABS:                        12.2   10.51 )-----------( 194      ( 19 Mar 2019 06:35 )             38.0     03-19    132<L>  |  96  |  25<H>  ----------------------------<  140<H>  3.6   |  27  |  0.74    Ca    7.8<L>      19 Mar 2019 06:35  Phos  3.5     03-19  Mg     1.8     03-19    TPro  7.6  /  Alb  3.9  /  TBili  0.5  /  DBili  x   /  AST  24  /  ALT  14  /  AlkPhos  71  03-18    PT/INR - ( 18 Mar 2019 15:29 )   PT: 17.3 sec;   INR: 1.51          PTT - ( 18 Mar 2019 15:29 )  PTT:33.6 sec  Urinalysis Basic - ( 18 Mar 2019 20:05 )    Color: Yellow / Appearance: Clear / SG: >=1.030 / pH: x  Gluc: x / Ketone: Trace mg/dL  / Bili: Negative / Urobili: 0.2 E.U./dL   Blood: x / Protein: 100 mg/dL / Nitrite: NEGATIVE   Leuk Esterase: NEGATIVE / RBC: < 5 /HPF / WBC < 5 /HPF   Sq Epi: x / Non Sq Epi: 0-5 /HPF / Bacteria: Present /HPF      CAPILLARY BLOOD GLUCOSE          RADIOLOGY & ADDITIONAL TESTS: Reviewed. Off Service Note:  Hospital Course:   89F w/ PMH HTN, AFib (on Eliquis), CAD, asthma, and hypothyroidism presenting w/ 2 day history of intermittently productive cough and weakness found to have A.     SUBJECTIVE / INTERVAL HPI: Patient reports feeling short of breath during the night and was placed on supplemental O2. Patient seen and examined at bedside. Patient reports feeling like she is able to breathe better on O2 but notes some sore throat. Cough is not productive at this time. Otherwise: (-) fever, (-) chills, (-) dizziness, (-) headache, (-) neck pain, (-) chest pain, (-) palpitations, (-)SOB, (-) nausea, (-) vomiting, (-) diarrhea, (-) abdominal pain, (-)new weakness, (-) paresthesias.        VITAL SIGNS:  Vital Signs Last 24 Hrs  T(C): 37.1 (19 Mar 2019 09:09), Max: 38.7 (18 Mar 2019 15:51)  T(F): 98.7 (19 Mar 2019 09:09), Max: 101.7 (18 Mar 2019 15:51)  HR: 84 (19 Mar 2019 09:09) (72 - 107)  BP: 106/64 (19 Mar 2019 09:09) (106/64 - 151/77)  BP(mean): --  RR: 18 (19 Mar 2019 09:09) (18 - 22)  SpO2: 93% (19 Mar 2019 09:09) (88% - 98%)    PHYSICAL EXAM:    General: well appearing, resting comfortably in bed and in no acute distress. Able to speak in full sentences but appears more winded with prolonged conversation  HEENT: normocephalic, atraumatic, PERRL, anicteric sclera; no conjunctival pallor, mucus membranes moist  Neck: supple, no masses  Cardiovascular: +S1/S2, regular rate and rhythm; no murmurs, no rub, no gallop  Respiratory: Upper airway sounds that clear with cough, mild rhonchi diffusely no wheeze, no rales  Gastrointestinal: soft, active bowel sounds, non-tender, non-distended  Extremities: Warm, dry; no edema, clubbing or cyanosis  Vascular: 2+ radial, DP pulses bilaterally  Neurological: AAOx3; no focal deficits    MEDICATIONS:  MEDICATIONS  (STANDING):  acetylcysteine 20%  Inhalation 4 milliLiter(s) Inhalation every 8 hours  ALBUTerol/ipratropium for Nebulization 3 milliLiter(s) Nebulizer every 4 hours  apixaban 2.5 milliGRAM(s) Oral every 12 hours  diltiazem    milliGRAM(s) Oral daily  enalapril 10 milliGRAM(s) Oral daily  levothyroxine 125 MICROGram(s) Oral daily  oseltamivir 30 milliGRAM(s) Oral every 24 hours  raloxifene 60 milliGRAM(s) Oral daily    MEDICATIONS  (PRN):  acetaminophen   Tablet .. 650 milliGRAM(s) Oral every 6 hours PRN Temp greater or equal to 38C (100.4F)      ALLERGIES:  Allergies    No Known Allergies    Intolerances        LABS:                        12.2   10.51 )-----------( 194      ( 19 Mar 2019 06:35 )             38.0     03-19    132<L>  |  96  |  25<H>  ----------------------------<  140<H>  3.6   |  27  |  0.74    Ca    7.8<L>      19 Mar 2019 06:35  Phos  3.5     03-19  Mg     1.8     03-19    TPro  7.6  /  Alb  3.9  /  TBili  0.5  /  DBili  x   /  AST  24  /  ALT  14  /  AlkPhos  71  03-18    PT/INR - ( 18 Mar 2019 15:29 )   PT: 17.3 sec;   INR: 1.51          PTT - ( 18 Mar 2019 15:29 )  PTT:33.6 sec  Urinalysis Basic - ( 18 Mar 2019 20:05 )    Color: Yellow / Appearance: Clear / SG: >=1.030 / pH: x  Gluc: x / Ketone: Trace mg/dL  / Bili: Negative / Urobili: 0.2 E.U./dL   Blood: x / Protein: 100 mg/dL / Nitrite: NEGATIVE   Leuk Esterase: NEGATIVE / RBC: < 5 /HPF / WBC < 5 /HPF   Sq Epi: x / Non Sq Epi: 0-5 /HPF / Bacteria: Present /HPF      CAPILLARY BLOOD GLUCOSE          RADIOLOGY & ADDITIONAL TESTS: Reviewed. Off Service Note:  Hospital Course:   89F w/ PMH HTN, AFib (on Eliquis), CAD, asthma, and hypothyroidism presenting w/ 2 day history of intermittently productive cough and weakness found to have sepsis secondary to flu A. Patient was treated with Tamiflu. Patient also found to be hyponatremic likely due to poor PO intake. Patient was given NS IVF with improvement.     SUBJECTIVE / INTERVAL HPI: Patient reports feeling short of breath during the night and was placed on supplemental O2. Patient seen and examined at bedside. Patient reports feeling like she is able to breathe better on O2 but notes some sore throat. Cough is not productive at this time. Otherwise: (-) fever, (-) chills, (-) dizziness, (-) headache, (-) neck pain, (-) chest pain, (-) palpitations, (-)SOB, (-) nausea, (-) vomiting, (-) diarrhea, (-) abdominal pain, (-)new weakness, (-) paresthesias.        VITAL SIGNS:  Vital Signs Last 24 Hrs  T(C): 37.1 (19 Mar 2019 09:09), Max: 38.7 (18 Mar 2019 15:51)  T(F): 98.7 (19 Mar 2019 09:09), Max: 101.7 (18 Mar 2019 15:51)  HR: 84 (19 Mar 2019 09:09) (72 - 107)  BP: 106/64 (19 Mar 2019 09:09) (106/64 - 151/77)  BP(mean): --  RR: 18 (19 Mar 2019 09:09) (18 - 22)  SpO2: 93% (19 Mar 2019 09:09) (88% - 98%)    PHYSICAL EXAM:    General: well appearing, resting comfortably in bed and in no acute distress. Able to speak in full sentences but appears more winded with prolonged conversation  HEENT: normocephalic, atraumatic, PERRL, anicteric sclera; no conjunctival pallor, mucus membranes moist  Neck: supple, no masses  Cardiovascular: +S1/S2, regular rate and rhythm; no murmurs, no rub, no gallop  Respiratory: Upper airway sounds that clear with cough, mild rhonchi diffusely no wheeze, no rales  Gastrointestinal: soft, active bowel sounds, non-tender, non-distended  Extremities: Warm, dry; no edema, clubbing or cyanosis  Vascular: 2+ radial, DP pulses bilaterally  Neurological: AAOx3; no focal deficits    MEDICATIONS:  MEDICATIONS  (STANDING):  acetylcysteine 20%  Inhalation 4 milliLiter(s) Inhalation every 8 hours  ALBUTerol/ipratropium for Nebulization 3 milliLiter(s) Nebulizer every 4 hours  apixaban 2.5 milliGRAM(s) Oral every 12 hours  diltiazem    milliGRAM(s) Oral daily  enalapril 10 milliGRAM(s) Oral daily  levothyroxine 125 MICROGram(s) Oral daily  oseltamivir 30 milliGRAM(s) Oral every 24 hours  raloxifene 60 milliGRAM(s) Oral daily    MEDICATIONS  (PRN):  acetaminophen   Tablet .. 650 milliGRAM(s) Oral every 6 hours PRN Temp greater or equal to 38C (100.4F)      ALLERGIES:  Allergies    No Known Allergies    Intolerances        LABS:                        12.2   10.51 )-----------( 194      ( 19 Mar 2019 06:35 )             38.0     03-19    132<L>  |  96  |  25<H>  ----------------------------<  140<H>  3.6   |  27  |  0.74    Ca    7.8<L>      19 Mar 2019 06:35  Phos  3.5     03-19  Mg     1.8     03-19    TPro  7.6  /  Alb  3.9  /  TBili  0.5  /  DBili  x   /  AST  24  /  ALT  14  /  AlkPhos  71  03-18    PT/INR - ( 18 Mar 2019 15:29 )   PT: 17.3 sec;   INR: 1.51          PTT - ( 18 Mar 2019 15:29 )  PTT:33.6 sec  Urinalysis Basic - ( 18 Mar 2019 20:05 )    Color: Yellow / Appearance: Clear / SG: >=1.030 / pH: x  Gluc: x / Ketone: Trace mg/dL  / Bili: Negative / Urobili: 0.2 E.U./dL   Blood: x / Protein: 100 mg/dL / Nitrite: NEGATIVE   Leuk Esterase: NEGATIVE / RBC: < 5 /HPF / WBC < 5 /HPF   Sq Epi: x / Non Sq Epi: 0-5 /HPF / Bacteria: Present /HPF      CAPILLARY BLOOD GLUCOSE          RADIOLOGY & ADDITIONAL TESTS: Reviewed. Off Service Note:  Hospital Course:   89F w/ PMH HTN, AFib (on Eliquis), CAD, asthma, and hypothyroidism presenting w/ 2 day history of intermittently productive cough and weakness found to have sepsis secondary to flu A. Patient was treated with Tamiflu and supportive care. Patient given mucinex and cepacol to assist with expectoration and sore throat. Patient also found to be hyponatremic likely due to hypovolemia given urine osm 479 and serum osm 279 secondary to poor PO intake. Patient was given NS IVF with improvement.     SUBJECTIVE / INTERVAL HPI: Patient reports feeling short of breath during the night and was placed on supplemental O2. Patient seen and examined at bedside. Patient reports feeling like she is able to breathe better on O2 but notes some sore throat. Cough is not productive at this time. Otherwise: (-) fever, (-) chills, (-) dizziness, (-) headache, (-) neck pain, (-) chest pain, (-) palpitations, (-)SOB, (-) nausea, (-) vomiting, (-) diarrhea, (-) abdominal pain, (-)new weakness, (-) paresthesias.        VITAL SIGNS:  Vital Signs Last 24 Hrs  T(C): 37.1 (19 Mar 2019 09:09), Max: 38.7 (18 Mar 2019 15:51)  T(F): 98.7 (19 Mar 2019 09:09), Max: 101.7 (18 Mar 2019 15:51)   HR: 84 (19 Mar 2019 09:09) (72 - 107)  BP: 106/64 (19 Mar 2019 09:09) (106/64 - 151/77)  BP(mean): --  RR: 18 (19 Mar 2019 09:09) (18 - 22)  SpO2: 93% (19 Mar 2019 09:09) (88% - 98%)    PHYSICAL EXAM:    General: well appearing, resting comfortably in bed and in no acute distress. Able to speak in full sentences but appears more winded with prolonged conversation  HEENT: normocephalic, atraumatic, PERRL, anicteric sclera; no conjunctival pallor, mucus membranes moist  Neck: supple, no masses  Cardiovascular: +S1/S2, regular rate and rhythm; no murmurs, no rub, no gallop  Respiratory: Upper airway sounds that clear with cough, mild rhonchi diffusely no wheeze, no rales  Gastrointestinal: soft, active bowel sounds, non-tender, non-distended  Extremities: Warm, dry; no edema, clubbing or cyanosis, capillary refill < 2 seconds  Vascular: 2+ radial, DP pulses bilaterally  Neurological: AAOx3; no focal deficits    MEDICATIONS:  MEDICATIONS  (STANDING):  acetylcysteine 20%  Inhalation 4 milliLiter(s) Inhalation every 8 hours  ALBUTerol/ipratropium for Nebulization 3 milliLiter(s) Nebulizer every 4 hours  apixaban 2.5 milliGRAM(s) Oral every 12 hours  diltiazem    milliGRAM(s) Oral daily  enalapril 10 milliGRAM(s) Oral daily  levothyroxine 125 MICROGram(s) Oral daily  oseltamivir 30 milliGRAM(s) Oral every 24 hours  raloxifene 60 milliGRAM(s) Oral daily    MEDICATIONS  (PRN):  acetaminophen   Tablet .. 650 milliGRAM(s) Oral every 6 hours PRN Temp greater or equal to 38C (100.4F)      ALLERGIES:  Allergies    No Known Allergies    Intolerances        LABS:                        12.2   10.51 )-----------( 194      ( 19 Mar 2019 06:35 )             38.0     03-19    132<L>  |  96  |  25<H>  ----------------------------<  140<H>  3.6   |  27  |  0.74    Ca    7.8<L>      19 Mar 2019 06:35  Phos  3.5     03-19  Mg     1.8     03-19    TPro  7.6  /  Alb  3.9  /  TBili  0.5  /  DBili  x   /  AST  24  /  ALT  14  /  AlkPhos  71  03-18    PT/INR - ( 18 Mar 2019 15:29 )   PT: 17.3 sec;   INR: 1.51          PTT - ( 18 Mar 2019 15:29 )  PTT:33.6 sec  Urinalysis Basic - ( 18 Mar 2019 20:05 )    Color: Yellow / Appearance: Clear / SG: >=1.030 / pH: x  Gluc: x / Ketone: Trace mg/dL  / Bili: Negative / Urobili: 0.2 E.U./dL   Blood: x / Protein: 100 mg/dL / Nitrite: NEGATIVE   Leuk Esterase: NEGATIVE / RBC: < 5 /HPF / WBC < 5 /HPF   Sq Epi: x / Non Sq Epi: 0-5 /HPF / Bacteria: Present /HPF      CAPILLARY BLOOD GLUCOSE          RADIOLOGY & ADDITIONAL TESTS: Reviewed.

## 2019-03-19 NOTE — PROGRESS NOTE ADULT - NSICDXPROBLEM_GEN_ALL_CORE_FT
PROBLEM DIAGNOSES  Problem: Influenza A  Assessment and Plan: Pt presenting with T of 101.7 rectal and Leukocytosis and 58% bands. CXR without clear infiltrates. UA negative. Found to be flu A (+).  -UC w/ NGTD  -BCx2 w/ NGTD   -Tamiflu (3/18-3/22)  -supportive care: cough drops  -Tylenol 650mg prn fever/pain  -supplemental O2 at 1-2L at this time. patient desaturated to 89% on RA.   -incentive spirometer  -Duonebs prn      Problem: Sepsis  Assessment and Plan: RESOLVED. Pt presenting with T of 101.7 rectal and Leukocytosis and 58% bands with possibe developing RLL infiltrate. s/p 1.45L bolus in ED. RVP (+) flu A    Problem: Essential hypertension  Assessment and Plan: Pt w/ hx of HTN. on enalpril 10mg PO qday  -Continue enalpril 10mg PO qday    Problem: Atrial fibrillation  Assessment and Plan: Pt w/ hx of afib. follows w/ Dr. Mosqueda. On eliquis 2.5m BID at home a    Problem: Hypothyroid  Assessment and Plan: hx of hypothyroidism s/p thyroidectomy for thyroid ca. on synthroid 125mcg PO qday  -Continue synthroid 125mcg PO qday    Problem: Nutrition, metabolism, and development symptoms  Assessment and Plan: F; no IVF for now   E K > 4 Mg > 2  N: DASH diet PROBLEM DIAGNOSES  Problem: Influenza A  Assessment and Plan: Pt presenting with T of 101.7 rectal and Leukocytosis and 58% bands. CXR without clear infiltrates. UA negative. Found to be flu A (+).  -UC w/ NGTD  -BCx2 w/ NGTD   -Tamiflu (3/18-3/22)  -supportive care: cepacol and mucinex  -Tylenol 650mg prn fever/pain  -supplemental O2 at 1-2L at this time. patient desaturated to 89% on RA.   -incentive spirometer  -Duonebs prn  -OOB      Problem: Sepsis  Assessment and Plan: RESOLVED. Pt presenting with T of 101.7 rectal and Leukocytosis and 58% bands with possibe developing RLL infiltrate. s/p 1.45L bolus in ED. RVP (+) flu A    Problem: Essential hypertension  Assessment and Plan: Pt w/ hx of HTN. on enalpril 10mg PO qday  -Continue enalpril 10mg PO qday    Problem: Atrial fibrillation  Assessment and Plan: Pt w/ hx of afib. follows w/ Dr. Mosqueda. On eliquis 2.5m BID at home a    Problem: Hyponatremia  Assessment and Plan: Pt with hyponatremia of 133 on admission possibly 2/2 to dehydration in the setting of  poor PO intake  - f/u serum osmolality  - f/u urine lytes     Problem: Hypothyroid  Assessment and Plan: hx of hypothyroidism s/p thyroidectomy for thyroid ca. on synthroid 125mcg PO qday  -Continue synthroid 125mcg PO qday    Problem: Nutrition, metabolism, and development symptoms  Assessment and Plan: F; no IVF for now   E K > 4 Mg > 2  N: DASH diet    Problem: Transition of care performed with sharing of clinical summary  Assessment and Plan: 1) PCP Contacted on Admission: (Y/N) --> Name & Phone #: Patel  2) Date of Contact with PCP:   3) PCP Contacted at Discharge: (Y/N)   4) Summary of Handoff Given to PCP:   5) Post-Discharge Appointment Date and Location: PROBLEM DIAGNOSES  Problem: Influenza A  Assessment and Plan: Pt presenting with T of 101.7 rectal and Leukocytosis and 58% bands. CXR without clear infiltrates. UA negative. Found to be flu A (+).  -UC w/ NGTD  -BCx2 w/ NGTD   -Tamiflu (3/18-3/22)  -supportive care: cepacol and mucinex  -Tylenol 650mg prn fever/pain  -supplemental O2 at 1-2L at this time. patient desaturated to 89% on RA. Wean off as tolerated  -incentive spirometer  -Duonebs prn  -OOB      Problem: Sepsis  Assessment and Plan: RESOLVED. Pt presenting with T of 101.7 rectal and Leukocytosis and 58% bands with possibe developing RLL infiltrate. s/p 1.45L bolus in ED. RVP (+) flu A    Problem: Essential hypertension  Assessment and Plan: Pt w/ hx of HTN. on enalpril 10mg PO qday  -Continue enalpril 10mg PO qday    Problem: Atrial fibrillation  Assessment and Plan: Pt w/ hx of afib. follows w/ Dr. Mosqueda. On eliquis 2.5m BID at home a    Problem: Hyponatremia  Assessment and Plan: Pt with hyponatremia of 133 on admission. Now at 132 unlikely to be 2/2 hypovolemia  -serum osm 279, urine osm: 479, urine sodium: 20      Problem: Hypothyroid  Assessment and Plan: hx of hypothyroidism s/p thyroidectomy for thyroid ca. on synthroid 125mcg PO qday  -Continue synthroid 125mcg PO qday    Problem: Nutrition, metabolism, and development symptoms  Assessment and Plan: F; no IVF for now   E K > 4 Mg > 2  N: DASH diet    Problem: Transition of care performed with sharing of clinical summary  Assessment and Plan: 1) PCP Contacted on Admission: (Y/N) --> Name & Phone #: Patel  2) Date of Contact with PCP:   3) PCP Contacted at Discharge: (Y/N)   4) Summary of Handoff Given to PCP:   5) Post-Discharge Appointment Date and Location: PROBLEM DIAGNOSES  Problem: Influenza A  Assessment and Plan: Pt presenting with T of 101.7 rectal and Leukocytosis and 58% bands. CXR without clear infiltrates. UA negative. Found to be flu A (+).  -UC w/ NGTD  -BCx2 w/ NGTD   -Tamiflu (3/18-3/22)  -supportive care: cepacol and mucinex  -Tylenol 650mg prn fever/pain  -supplemental O2 at 1-2L at this time. patient desaturated to 89% on RA. Wean off as tolerated  -incentive spirometer  -Duonebs prn  -OOB      Problem: Sepsis  Assessment and Plan: RESOLVED. Pt presenting with T of 101.7 rectal and Leukocytosis and 58% bands with possibe developing RLL infiltrate. s/p 1.45L bolus in ED. RVP (+) flu A    Problem: Essential hypertension  Assessment and Plan: Pt w/ hx of HTN. on enalpril 10mg PO qday  -Continue enalpril 10mg PO qday    Problem: Atrial fibrillation  Assessment and Plan: Pt w/ hx of afib. follows w/ Dr. Mosqueda. On eliquis 2.5m BID at home a    Problem: Hyponatremia  Assessment and Plan: Pt with hyponatremia of 133 on admission. Now at 132 likely to be 2/2 hypovolemia given serum osm 279, urine osm: 479, urine sodium: 20  -trend BMP  -encourage PO intake      Problem: Hypothyroid  Assessment and Plan: hx of hypothyroidism s/p thyroidectomy for thyroid ca. on synthroid 125mcg PO qday  -Continue synthroid 125mcg PO qday    Problem: Nutrition, metabolism, and development symptoms  Assessment and Plan: F; no IVF for now   E K > 4 Mg > 2  N: DASH diet    Problem: Transition of care performed with sharing of clinical summary  Assessment and Plan: 1) PCP Contacted on Admission: (Y/N) --> Name & Phone #: Jorge  2) Date of Contact with PCP:   3) PCP Contacted at Discharge: (Y/N)   4) Summary of Handoff Given to PCP:   5) Post-Discharge Appointment Date and Location: PROBLEM DIAGNOSES  Problem: Influenza A  Assessment and Plan: Pt presenting with T of 101.7 rectal and Leukocytosis and 58% bands. CXR without clear infiltrates. UA negative. Found to be flu A (+).  -UC w/ NGTD  -BCx2 w/ NGTD   -Tamiflu (3/18-3/22)  -supportive care: cepacol and mucinex  -Tylenol 650mg prn fever/pain  -supplemental O2 at 1-2L at this time. patient desaturated to 89% on RA. Wean off as tolerated  -incentive spirometer  -Duonebs prn  -OOB      Problem: Sepsis  Assessment and Plan: RESOLVED. Pt presenting with T of 101.7 rectal and Leukocytosis and 58% bands with possibe developing RLL infiltrate. s/p 1.45L bolus in ED. RVP (+) flu A.  Reperfusion assessment completed.     Problem: Essential hypertension  Assessment and Plan: Pt w/ hx of HTN. on enalpril 10mg PO qday  -Continue enalpril 10mg PO qday    Problem: Atrial fibrillation  Assessment and Plan: Pt w/ hx of afib. follows w/ Dr. Mosqueda. On eliquis 2.5m BID at home a    Problem: Hyponatremia  Assessment and Plan: Pt with hyponatremia of 133 on admission. Now at 132 likely to be 2/2 hypovolemia given serum osm 279, urine osm: 479, urine sodium: 20  -trend BMP  -encourage PO intake      Problem: Hypothyroid  Assessment and Plan: hx of hypothyroidism s/p thyroidectomy for thyroid ca. on synthroid 125mcg PO qday  -Continue synthroid 125mcg PO qday    Problem: Nutrition, metabolism, and development symptoms  Assessment and Plan: F; no IVF for now   E K > 4 Mg > 2  N: DASH diet    Problem: Transition of care performed with sharing of clinical summary  Assessment and Plan: 1) PCP Contacted on Admission: (Y/N) --> Name & Phone #: Jorge  2) Date of Contact with PCP:   3) PCP Contacted at Discharge: (Y/N)   4) Summary of Handoff Given to PCP:   5) Post-Discharge Appointment Date and Location: PROBLEM DIAGNOSES  Problem: Influenza A  Assessment and Plan: Pt presenting with T of 101.7 rectal and Leukocytosis and 58% bands. CXR without clear infiltrates. UA negative. Found to be flu A (+).  -UC w/ NGTD  -BCx2 w/ NGTD   -Tamiflu (3/18-3/22)  -supportive care: cepacol and mucinex  -Tylenol 650mg prn fever/pain  -supplemental O2 at 1-2L at this time. patient desaturated to 89% on RA. Wean off as tolerated  -incentive spirometer  -Duonebs prn  -OOB      Problem: Sepsis  Assessment and Plan: RESOLVED. Pt presenting with T of 101.7 rectal and Leukocytosis and 58% bands with possibe developing RLL infiltrate. s/p 1.45L bolus in ED. RVP (+) flu A.  Reperfusion assessment completed.     Problem: Essential hypertension  Assessment and Plan: Pt w/ hx of HTN. on enalpril 10mg PO qday  -Continue enalpril 10mg PO qday    Problem: Atrial fibrillation  Assessment and Plan: Pt w/ hx of afib. follows w/ Dr. Mosqueda. On eliquis 2.5m BID and diltiazem 300mg qday  -Continue eliquis and diltiazem    Problem: Hyponatremia  Assessment and Plan: Pt with hyponatremia of 133 on admission. Now at 132 likely to be 2/2 hypovolemia given serum osm 279, urine osm: 479, urine sodium: 20  -trend BMP  -encourage PO intake      Problem: Hypothyroid  Assessment and Plan: hx of hypothyroidism s/p thyroidectomy for thyroid ca. on synthroid 125mcg PO qday  -Continue synthroid 125mcg PO qday    Problem: Nutrition, metabolism, and development symptoms  Assessment and Plan: F; no IVF for now   E K > 4 Mg > 2  N: DASH diet    Problem: Transition of care performed with sharing of clinical summary  Assessment and Plan: 1) PCP Contacted on Admission: (Y/N) --> Name & Phone #: Patel  2) Date of Contact with PCP:   3) PCP Contacted at Discharge: (Y/N)   4) Summary of Handoff Given to PCP:   5) Post-Discharge Appointment Date and Location:

## 2019-03-19 NOTE — PROGRESS NOTE ADULT - ASSESSMENT
Pt is a 89 y/o F hx of  Afib (on Eliquis), CAD, HTN, asthma and hypothyroid p/w cough and weakness x 2 days admitted to [facility] due to sepsis 2/2 PNA but found to have flu A. Pt is a 89 y/o F hx of  Afib (on Eliquis), CAD, HTN, asthma and hypothyroid p/w cough and weakness x 2 days found to meet sepsis on admission secondary to flu A.

## 2019-03-19 NOTE — PROGRESS NOTE ADULT - ATTENDING COMMENTS
Patient was seen and examined with the resident team today.  I agree with Dr. Sellers's assessment and plan with the following exceptions/additions:     Briefly, a 89yo woman with a PMH of HTN, AFib (on Eliquis), CAD, asthma and thyroid CA s/p thyroidectomy c/b hypothyroidism who p/w intermittently productive cough and weakness x2 days and subsequently found to have sepsis secondary to flu A.  SpO 89-90% on room air; SpO2 94-95% on 1-2L NC.  Afebrile since arriving to the floor.  Exam - well-appearing but coughing, MMM, clear OP, intermittent rhonchi but mostly CTA, irregular but not tachy, +BS soft NTND, WWP, AOx3 w/noticeable hearing impairment.  Labs - WBC 10.51, Hb 12.2, Na 132, Cr 0.74.    -- c/w Tamiflu  -- c/w nebs  -- encourage IS   -- wean O2 as able  -- c/w home Dilt  -- c/w home Enalapril  -- c/w home Levothyroxine   -- c/w home Raloxifene  -- DVT PPx - Apixiban  -- Dispo - likely home once hypoxia resolves (Tues v Wed)    Radha Wilkinson  [phone]

## 2019-03-20 ENCOUNTER — APPOINTMENT (OUTPATIENT)
Dept: HEART AND VASCULAR | Facility: CLINIC | Age: 84
End: 2019-03-20

## 2019-03-20 LAB
ANION GAP SERPL CALC-SCNC: 11 MMOL/L — SIGNIFICANT CHANGE UP (ref 5–17)
BUN SERPL-MCNC: 25 MG/DL — HIGH (ref 7–23)
CALCIUM SERPL-MCNC: 7.9 MG/DL — LOW (ref 8.4–10.5)
CHLORIDE SERPL-SCNC: 96 MMOL/L — SIGNIFICANT CHANGE UP (ref 96–108)
CO2 SERPL-SCNC: 27 MMOL/L — SIGNIFICANT CHANGE UP (ref 22–31)
CREAT SERPL-MCNC: 0.65 MG/DL — SIGNIFICANT CHANGE UP (ref 0.5–1.3)
CULTURE RESULTS: NO GROWTH — SIGNIFICANT CHANGE UP
GLUCOSE SERPL-MCNC: 115 MG/DL — HIGH (ref 70–99)
HCT VFR BLD CALC: 36.5 % — SIGNIFICANT CHANGE UP (ref 34.5–45)
HGB BLD-MCNC: 11.8 G/DL — SIGNIFICANT CHANGE UP (ref 11.5–15.5)
MAGNESIUM SERPL-MCNC: 2.4 MG/DL — SIGNIFICANT CHANGE UP (ref 1.6–2.6)
MCHC RBC-ENTMCNC: 30.4 PG — SIGNIFICANT CHANGE UP (ref 27–34)
MCHC RBC-ENTMCNC: 32.3 GM/DL — SIGNIFICANT CHANGE UP (ref 32–36)
MCV RBC AUTO: 94.1 FL — SIGNIFICANT CHANGE UP (ref 80–100)
NRBC # BLD: 0 /100 WBCS — SIGNIFICANT CHANGE UP (ref 0–0)
PHOSPHATE SERPL-MCNC: 2.7 MG/DL — SIGNIFICANT CHANGE UP (ref 2.5–4.5)
PLATELET # BLD AUTO: 190 K/UL — SIGNIFICANT CHANGE UP (ref 150–400)
POTASSIUM SERPL-MCNC: 3.7 MMOL/L — SIGNIFICANT CHANGE UP (ref 3.5–5.3)
POTASSIUM SERPL-SCNC: 3.7 MMOL/L — SIGNIFICANT CHANGE UP (ref 3.5–5.3)
RBC # BLD: 3.88 M/UL — SIGNIFICANT CHANGE UP (ref 3.8–5.2)
RBC # FLD: 14.7 % — HIGH (ref 10.3–14.5)
SODIUM SERPL-SCNC: 134 MMOL/L — LOW (ref 135–145)
SPECIMEN SOURCE: SIGNIFICANT CHANGE UP
WBC # BLD: 11.07 K/UL — HIGH (ref 3.8–10.5)
WBC # FLD AUTO: 11.07 K/UL — HIGH (ref 3.8–10.5)

## 2019-03-20 PROCEDURE — 99233 SBSQ HOSP IP/OBS HIGH 50: CPT | Mod: GC

## 2019-03-20 RX ORDER — POTASSIUM CHLORIDE 20 MEQ
40 PACKET (EA) ORAL ONCE
Qty: 0 | Refills: 0 | Status: COMPLETED | OUTPATIENT
Start: 2019-03-20 | End: 2019-03-20

## 2019-03-20 RX ADMIN — Medication 3 MILLILITER(S): at 17:44

## 2019-03-20 RX ADMIN — BENZOCAINE AND MENTHOL 1 LOZENGE: 5; 1 LIQUID ORAL at 12:49

## 2019-03-20 RX ADMIN — Medication 300 MILLIGRAM(S): at 06:51

## 2019-03-20 RX ADMIN — Medication 3 MILLILITER(S): at 07:31

## 2019-03-20 RX ADMIN — Medication 10 MILLIGRAM(S): at 06:51

## 2019-03-20 RX ADMIN — RALOXIFENE HYDROCHLORIDE 60 MILLIGRAM(S): 60 TABLET, COATED ORAL at 12:49

## 2019-03-20 RX ADMIN — BENZOCAINE AND MENTHOL 1 LOZENGE: 5; 1 LIQUID ORAL at 01:10

## 2019-03-20 RX ADMIN — APIXABAN 2.5 MILLIGRAM(S): 2.5 TABLET, FILM COATED ORAL at 17:43

## 2019-03-20 RX ADMIN — APIXABAN 2.5 MILLIGRAM(S): 2.5 TABLET, FILM COATED ORAL at 06:52

## 2019-03-20 RX ADMIN — Medication 125 MICROGRAM(S): at 06:51

## 2019-03-20 RX ADMIN — Medication 3 MILLILITER(S): at 03:22

## 2019-03-20 RX ADMIN — Medication 3 MILLILITER(S): at 12:46

## 2019-03-20 RX ADMIN — BENZOCAINE AND MENTHOL 1 LOZENGE: 5; 1 LIQUID ORAL at 06:52

## 2019-03-20 RX ADMIN — Medication 40 MILLIEQUIVALENT(S): at 12:48

## 2019-03-20 NOTE — PROGRESS NOTE ADULT - PROBLEM SELECTOR PLAN 2
RESOLVED. Pt presenting with T of 101.7 rectal and Leukocytosis and 58% bands with possible developing RLL infiltrate. s/p 1.45L bolus in ED. RVP (+) flu A.  Reperfusion assessment completed.

## 2019-03-20 NOTE — PROGRESS NOTE ADULT - SUBJECTIVE AND OBJECTIVE BOX
Overnight: JIN    Subjective: Pt seen and examined at bedside. Denies CP, SOB, cough. Feels well.    Objective:  VITAL SIGNS:  T(C): 36.8 (03-20-19 @ 08:55), Max: 37.1 (03-19-19 @ 16:07)  T(F): 98.2 (03-20-19 @ 08:55), Max: 98.8 (03-19-19 @ 16:07)  HR: 85 (03-20-19 @ 10:37) (67 - 90)  BP: 115/71 (03-20-19 @ 08:55) (115/71 - 132/71)  BP(mean): --  RR: 18 (03-20-19 @ 08:55) (18 - 19)  SpO2: 92% (03-20-19 @ 10:37) (92% - 97%)  Wt(kg): --    PHYSICAL EXAM:  Constitutional: WDWN elderly female resting comfortably in bed; NAD  Head: NC/AT  Eyes:  clear conjunctiva  ENT: no nasal discharge; MMM  Respiratory: b/l inspiratory and expiratory wheezing,  no retractions  Cardiac: +S1/S2; RRR; no M/R/G  Gastrointestinal: soft, NT/ND; no rebound or guarding; hypoactive BS  Extremities: WWP, no clubbing or cyanosis; no peripheral edema  Dermatologic: skin warm, dry and intact; no rashes, wounds, or scars  Psychiatric: affect and characteristics of appearance, verbalizations, behaviors are appropriate    MEDICATIONS  (STANDING):  ALBUTerol/ipratropium for Nebulization 3 milliLiter(s) Nebulizer every 4 hours  apixaban 2.5 milliGRAM(s) Oral every 12 hours  benzocaine 15 mG/menthol 3.6 mG (Sugar-Free) Lozenge 1 Lozenge Oral every 6 hours  diltiazem    milliGRAM(s) Oral daily  enalapril 10 milliGRAM(s) Oral daily  levothyroxine 125 MICROGram(s) Oral daily  oseltamivir 30 milliGRAM(s) Oral every 24 hours  raloxifene 60 milliGRAM(s) Oral daily    MEDICATIONS  (PRN):  acetaminophen   Tablet .. 650 milliGRAM(s) Oral every 6 hours PRN Temp greater or equal to 38C (100.4F)  guaiFENesin/dextromethorphan  Syrup 10 milliLiter(s) Oral every 4 hours PRN Cough    .  LABS:                         11.8   11.07 )-----------( 190      ( 20 Mar 2019 06:51 )             36.5     03-20    134<L>  |  96  |  25<H>  ----------------------------<  115<H>  3.7   |  27  |  0.65    Ca    7.9<L>      20 Mar 2019 06:51  Phos  2.7     03-20  Mg     2.4     03-20    TPro  7.6  /  Alb  3.9  /  TBili  0.5  /  DBili  x   /  AST  24  /  ALT  14  /  AlkPhos  71  03-18    PT/INR - ( 18 Mar 2019 15:29 )   PT: 17.3 sec;   INR: 1.51          PTT - ( 18 Mar 2019 15:29 )  PTT:33.6 sec  Urinalysis Basic - ( 18 Mar 2019 20:05 )    Color: Yellow / Appearance: Clear / SG: >=1.030 / pH: x  Gluc: x / Ketone: Trace mg/dL  / Bili: Negative / Urobili: 0.2 E.U./dL   Blood: x / Protein: 100 mg/dL / Nitrite: NEGATIVE   Leuk Esterase: NEGATIVE / RBC: < 5 /HPF / WBC < 5 /HPF   Sq Epi: x / Non Sq Epi: 0-5 /HPF / Bacteria: Present /HPF      CARDIAC MARKERS ( 18 Mar 2019 15:29 )  x     / <0.01 ng/mL / x     / x     / x                RADIOLOGY, EKG & ADDITIONAL TESTS: Reviewed. Overnight: JIN    Subjective: Pt seen and examined at bedside. Denies CP, SOB, cough. Feels well.    Objective:  VITAL SIGNS:  T(C): 36.8 (03-20-19 @ 08:55), Max: 37.1 (03-19-19 @ 16:07)  T(F): 98.2 (03-20-19 @ 08:55), Max: 98.8 (03-19-19 @ 16:07)  HR: 85 (03-20-19 @ 10:37) (67 - 90)  BP: 115/71 (03-20-19 @ 08:55) (115/71 - 132/71)  BP(mean): --  RR: 18 (03-20-19 @ 08:55) (18 - 19)  SpO2: 92% (03-20-19 @ 10:37) (92% - 97%)  Wt(kg): --    PHYSICAL EXAM:  Constitutional: WDWN elderly female resting comfortably in bed; underweight but not malnourished; NAD  Head: NC/AT  Eyes:  clear conjunctiva  ENT: no nasal discharge; MMM  Respiratory: b/l inspiratory and expiratory wheezing,  no retractions  Cardiac: +S1/S2; RRR; no M/R/G  Gastrointestinal: soft, NT/ND; no rebound or guarding; hypoactive BS  Extremities: WWP, no clubbing or cyanosis; no peripheral edema  Dermatologic: skin warm, dry and intact; no rashes, wounds, or scars  Psychiatric: affect and characteristics of appearance, verbalizations, behaviors are appropriate    MEDICATIONS  (STANDING):  ALBUTerol/ipratropium for Nebulization 3 milliLiter(s) Nebulizer every 4 hours  apixaban 2.5 milliGRAM(s) Oral every 12 hours  benzocaine 15 mG/menthol 3.6 mG (Sugar-Free) Lozenge 1 Lozenge Oral every 6 hours  diltiazem    milliGRAM(s) Oral daily  enalapril 10 milliGRAM(s) Oral daily  levothyroxine 125 MICROGram(s) Oral daily  oseltamivir 30 milliGRAM(s) Oral every 24 hours  raloxifene 60 milliGRAM(s) Oral daily    MEDICATIONS  (PRN):  acetaminophen   Tablet .. 650 milliGRAM(s) Oral every 6 hours PRN Temp greater or equal to 38C (100.4F)  guaiFENesin/dextromethorphan  Syrup 10 milliLiter(s) Oral every 4 hours PRN Cough    .  LABS:                         11.8   11.07 )-----------( 190      ( 20 Mar 2019 06:51 )             36.5     03-20    134<L>  |  96  |  25<H>  ----------------------------<  115<H>  3.7   |  27  |  0.65    Ca    7.9<L>      20 Mar 2019 06:51  Phos  2.7     03-20  Mg     2.4     03-20    TPro  7.6  /  Alb  3.9  /  TBili  0.5  /  DBili  x   /  AST  24  /  ALT  14  /  AlkPhos  71  03-18    PT/INR - ( 18 Mar 2019 15:29 )   PT: 17.3 sec;   INR: 1.51          PTT - ( 18 Mar 2019 15:29 )  PTT:33.6 sec  Urinalysis Basic - ( 18 Mar 2019 20:05 )    Color: Yellow / Appearance: Clear / SG: >=1.030 / pH: x  Gluc: x / Ketone: Trace mg/dL  / Bili: Negative / Urobili: 0.2 E.U./dL   Blood: x / Protein: 100 mg/dL / Nitrite: NEGATIVE   Leuk Esterase: NEGATIVE / RBC: < 5 /HPF / WBC < 5 /HPF   Sq Epi: x / Non Sq Epi: 0-5 /HPF / Bacteria: Present /HPF      CARDIAC MARKERS ( 18 Mar 2019 15:29 )  x     / <0.01 ng/mL / x     / x     / x                RADIOLOGY, EKG & ADDITIONAL TESTS: Reviewed.

## 2019-03-20 NOTE — PROGRESS NOTE ADULT - PROBLEM SELECTOR PLAN 8
1) PCP Contacted on Admission: (Y/N) --> Name & Phone #: Dr. Alisha Patel [phone]., Y  2) Date of Contact with PCP: on admission  3) PCP Contacted at Discharge: (Y/N)   4) Summary of Handoff Given to PCP: MD aware of admission, saw pt in the hospital  5) Post-Discharge Appointment Date and Location:

## 2019-03-20 NOTE — PROGRESS NOTE ADULT - PROBLEM SELECTOR PLAN 1
Pt presenting with T of 101.7 rectal and Leukocytosis and 58% bands. CXR without clear infiltrates. UA negative. Found to be flu A (+).  -UC w/ NGTD  -BCx2 w/ NGTD   -Tamiflu (3/18-3/22)  -supportive care: cepacol, robitussin  -Tylenol 650mg prn fever/pain  -supplemental O2 at 1-2L at this time. patient desaturated to 88% on RA while ambulating, 92% on RA at rest. Wean off as tolerated  -incentive spirometer  -Duonebs q4

## 2019-03-20 NOTE — PROGRESS NOTE ADULT - ASSESSMENT
Briefly, this is a jennifer 89yo woman with a PMH of HTN, AFib (on Eliquis), CAD, asthma and thyroid CA s/p thyroidectomy c/b hypothyroidism who p/w intermittently productive cough and weakness x2 days and subsequently found to have sepsis secondary to flu A.  Clinically improving today; however, remains hypoxic with minimal exertion, which is limiting her discharge.     -- c/w Tamiflu x 5 days total (day 3)  -- c/w nebs q4h --> can switch to PRN  -- encourage IS   -- wean O2 as able  -- c/w home Diltiazem   -- c/w home Enalapril  -- c/w home Levothyroxine   -- c/w home Raloxifene  -- remainder of plan per Dr. Ogden's progress note from today  -- DVT PPx - Apixiban  -- Dispo - home once hypoxia resolves; hopefully 3/21    Radha Wilkinson  [phone]

## 2019-03-20 NOTE — PHYSICAL THERAPY INITIAL EVALUATION ADULT - CRITERIA FOR SKILLED THERAPEUTIC INTERVENTIONS
rehab potential/therapy frequency/anticipated discharge recommendation/functional limitations in following categories/impairments found

## 2019-03-20 NOTE — CONSULT NOTE ADULT - SUBJECTIVE AND OBJECTIVE BOX
Patient is a 89y old  Female who presents with a chief complaint of Cough and weakness (19 Mar 2019 09:10)       HPI:  Pt is a 89 y/o F hx of  Afib (on Eliquis), CAD, HTN, asthma and hypothyroid p/w cough and weakness x 2 days. Pt was in her usual state of health on Saturday (316/19) and woke up the next morning with cough , fatigue, and chills. Pt states she has intermittent productive cough with yellow sputum. Pt also reports feeling weak and having decreased appetite. She denies any incident of falling. Pt denies any sick contacts or recent travel history. Reports that she has received the pneumonia vaccine and flu shot this year. Pt endorses mild shortness of breathe but denies any CP, Palpitations, N/V, Diarrhea, urinary frequency, dysuria, abdominal pain, melena, hematochezia, or hemoptysis.     In the ED vitals: rectal T 101.7, HR 82, /65, RR 18, 96% on supplemental O2. CXR with possible haziness on RLL but pending official read. Pt received 1.45L NS bolus, tylenol and Levaquin. Pt was admitted to [facility] due to sepsis 2/2 suspected PNA. (18 Mar 2019 17:37)      PAST MEDICAL & SURGICAL HISTORY:  Atrial fibrillation: Atrial fibrillation  Essential hypertension: HTN (hypertension)  Malignant neoplasm of thyroid gland: Thyroid ca  Other postprocedural status: S/P thyroidectomy  History of total hip replacement: S/P hip replacement      MEDICATIONS  (STANDING):  ALBUTerol/ipratropium for Nebulization 3 milliLiter(s) Nebulizer every 4 hours  apixaban 2.5 milliGRAM(s) Oral every 12 hours  benzocaine 15 mG/menthol 3.6 mG (Sugar-Free) Lozenge 1 Lozenge Oral every 6 hours  diltiazem    milliGRAM(s) Oral daily  enalapril 10 milliGRAM(s) Oral daily  levothyroxine 125 MICROGram(s) Oral daily  oseltamivir 30 milliGRAM(s) Oral every 24 hours  potassium chloride    Tablet ER 40 milliEquivalent(s) Oral once  raloxifene 60 milliGRAM(s) Oral daily    MEDICATIONS  (PRN):  acetaminophen   Tablet .. 650 milliGRAM(s) Oral every 6 hours PRN Temp greater or equal to 38C (100.4F)  guaiFENesin/dextromethorphan  Syrup 10 milliLiter(s) Oral every 4 hours PRN Cough      Social History: , has a son Jimi who lives in [place], lives alone in an elevator accessible/ doorman building, no home care services    Functional Level Prior to Admission: states she is ADL independent, walks without assistive devices    FAMILY HISTORY:  Family history of cardiovascular disease: Family history of hypertension      CBC Full  -  ( 20 Mar 2019 06:51 )  WBC Count : 11.07 K/uL  Hemoglobin : 11.8 g/dL  Hematocrit : 36.5 %  Platelet Count - Automated : 190 K/uL  Mean Cell Volume : 94.1 fl  Mean Cell Hemoglobin : 30.4 pg  Mean Cell Hemoglobin Concentration : 32.3 gm/dL  Auto Neutrophil # : x  Auto Lymphocyte # : x  Auto Monocyte # : x  Auto Eosinophil # : x  Auto Basophil # : x  Auto Neutrophil % : x  Auto Lymphocyte % : x  Auto Monocyte % : x  Auto Eosinophil % : x  Auto Basophil % : x      03-20    134<L>  |  96  |  25<H>  ----------------------------<  115<H>  3.7   |  27  |  0.65    Ca    7.9<L>      20 Mar 2019 06:51  Phos  2.7     03-20  Mg     2.4     03-20    TPro  7.6  /  Alb  3.9  /  TBili  0.5  /  DBili  x   /  AST  24  /  ALT  14  /  AlkPhos  71  03-18      Urinalysis Basic - ( 18 Mar 2019 20:05 )    Color: Yellow / Appearance: Clear / SG: >=1.030 / pH: x  Gluc: x / Ketone: Trace mg/dL  / Bili: Negative / Urobili: 0.2 E.U./dL   Blood: x / Protein: 100 mg/dL / Nitrite: NEGATIVE   Leuk Esterase: NEGATIVE / RBC: < 5 /HPF / WBC < 5 /HPF   Sq Epi: x / Non Sq Epi: 0-5 /HPF / Bacteria: Present /HPF          Radiology:    < from: Xray Chest 1 View- PORTABLE-Urgent (03.19.19 @ 03:48) >  EXAM:  XR CHEST PORTABLE URGENT 1V                          PROCEDURE DATE:  03/19/2019          INTERPRETATION:  Portable chest    History: Atrial fibrillation abnormal breath sounds flow    Impression:    Unchanged compared to prior exam 3/18/2019. Scattered increased lung   markings with no focal consolidation. No pleural effusion. No   pneumothorax. Prominent size heart and degenerative changes both   shoulders again noted.                Vital Signs Last 24 Hrs  T(C): 36.8 (20 Mar 2019 08:55), Max: 37.1 (19 Mar 2019 16:07)  T(F): 98.2 (20 Mar 2019 08:55), Max: 98.8 (19 Mar 2019 16:07)  HR: 88 (20 Mar 2019 08:55) (79 - 90)  BP: 115/71 (20 Mar 2019 08:55) (115/71 - 132/71)  BP(mean): --  RR: 18 (20 Mar 2019 08:55) (18 - 19)  SpO2: 93% (20 Mar 2019 08:55) (93% - 96%)    REVIEW OF SYSTEMS:    CONSTITUTIONAL:  fatigue  EYES: No eye pain, visual disturbances, or discharge  ENMT:  No difficulty hearing, tinnitus, vertigo; No sinus or throat pain  NECK: No pain or stiffness  BREASTS: No pain, masses, or nipple discharge  RESPIRATORY: No cough, wheezing, chills or hemoptysis; No shortness of breath  CARDIOVASCULAR: No chest pain, palpitations, dizziness, or leg swelling  GASTROINTESTINAL: No abdominal or epigastric pain. No nausea, vomiting, or hematemesis; No diarrhea or constipation. No melena or hematochezia.  GENITOURINARY: No dysuria, frequency, hematuria, or incontinence  NEUROLOGICAL: No headaches, memory loss, loss of strength, numbness, or tremors  SKIN: No itching, burning, rashes, or lesions   LYMPH NODES: No enlarged glands  ENDOCRINE: No heat or cold intolerance; No hair loss  MUSCULOSKELETAL: No joint pain or swelling; No muscle, back, or extremity pain  PSYCHIATRIC: No depression, anxiety, mood swings, or difficulty sleeping  HEME/LYMPH: No easy bruising, or bleeding gums  ALLERGY AND IMMUNOLOGIC: No hives or eczema  VASCULAR: no swelling, erythema      Physical Exam: on droplet isolation, 89 yo woman lying in semi Kim's position, c/o feeling tired    Head: normocephalic, atraumatic    Eyes: PERRLA, EOMI, no nystagmus, sclera anicteric    ENT: nasal discharge, uvula midline, no oropharyngeal erythema/exudate    Neck: supple, negative JVD, negative carotid bruits, no thyromegaly    Chest: rhonchi at left base    Cardiovascular: regular rate and rhythm, neg murmurs/rubs/gallops    Abdomen: soft, non distended, non tender, negative rebound/guarding, normal bowel sounds, neg hepatosplenomegaly    Extremities: WWP, neg cyanosis/clubbing/edema, negative calf tenderness to palpation, negative Rashawn's sign    :     Neurologic Exam:    Alert and oriented to person, place, date/year, speech fluent w/o dysarthria, recent and remote memory intact, repetition intact, comprehension intact,     Cranial Nerves:     II:                       pupils equal, round and reactive to light, visual fields intact   III/ IV/VI:            extraocular movements intact, neg nystagmus, ptosis  V:                       facial sensation intact, V1-3 normal  VII:                     face symmetric, no droop, normal eye closure and smile  VIII:                    hearing intact to finger rub bilaterally  IX/ X:                 soft palate rise symmetrical  XI:                      head turning, shoulder shrug normal  XII:                     tongue midline    Motor Exam:    Upper Extremities:     RIght:    no focal weakness               negative drift    Left :     no focal weakness               negative drift    Lower Extremities:                 Right:     no focal weakness    Left:       no focal weakness      Sensory:    intact to LT/PP in all UE/LE dermatomes    DTR:            = biceps/     triceps/     brachioradialis                      = patella/   medial hamstring/ankle                      neg clonus                      neg Babinski                      neg Hoffmans    Finger to Nose:  wnl    Heel to Shin:  wnl    Rapid Alternating movements:  wnl    Joint Position Sense:  intact    Romberg:  not tested    Tandem Walking:  not tested    Gait:  not tested        PM&R Impression:    1) deconditioned  2) no focal weakness  3) influenza A        Recommendations:    1) Physical therapy focusing on therapeutic exercises, bed mobility/transfer out of bed evaluation, progressive ambulation with assistive devices prn.    2) Anticipated Disposition Plan/Recs:  pending functional progress, anticipate d/c home Patient is a 89y old  Female who presents with a chief complaint of Cough and weakness (19 Mar 2019 09:10)       HPI:  Pt is a 89 y/o F hx of  Afib (on Eliquis), CAD, HTN, asthma and hypothyroid p/w cough and weakness x 2 days. Pt was in her usual state of health on Saturday (316/19) and woke up the next morning with cough , fatigue, and chills. Pt states she has intermittent productive cough with yellow sputum. Pt also reports feeling weak and having decreased appetite. She denies any incident of falling. Pt denies any sick contacts or recent travel history. Reports that she has received the pneumonia vaccine and flu shot this year. Pt endorses mild shortness of breathe but denies any CP, Palpitations, N/V, Diarrhea, urinary frequency, dysuria, abdominal pain, melena, hematochezia, or hemoptysis.     In the ED vitals: rectal T 101.7, HR 82, /65, RR 18, 96% on supplemental O2. CXR with possible haziness on RLL but pending official read. Pt received 1.45L NS bolus, tylenol and Levaquin. Pt was admitted to [facility] due to sepsis 2/2 suspected PNA. (18 Mar 2019 17:37)      PAST MEDICAL & SURGICAL HISTORY:  Atrial fibrillation: Atrial fibrillation  Essential hypertension: HTN (hypertension)  Malignant neoplasm of thyroid gland: Thyroid ca  Other postprocedural status: S/P thyroidectomy  History of total hip replacement: S/P hip replacement      MEDICATIONS  (STANDING):  ALBUTerol/ipratropium for Nebulization 3 milliLiter(s) Nebulizer every 4 hours  apixaban 2.5 milliGRAM(s) Oral every 12 hours  benzocaine 15 mG/menthol 3.6 mG (Sugar-Free) Lozenge 1 Lozenge Oral every 6 hours  diltiazem    milliGRAM(s) Oral daily  enalapril 10 milliGRAM(s) Oral daily  levothyroxine 125 MICROGram(s) Oral daily  oseltamivir 30 milliGRAM(s) Oral every 24 hours  potassium chloride    Tablet ER 40 milliEquivalent(s) Oral once  raloxifene 60 milliGRAM(s) Oral daily    MEDICATIONS  (PRN):  acetaminophen   Tablet .. 650 milliGRAM(s) Oral every 6 hours PRN Temp greater or equal to 38C (100.4F)  guaiFENesin/dextromethorphan  Syrup 10 milliLiter(s) Oral every 4 hours PRN Cough      Social History: , has a son Jimi who lives in [place], lives alone in an elevator accessible/ doorman building, no home care services    Functional Level Prior to Admission: states she is ADL independent, walks without assistive devices    FAMILY HISTORY:  Family history of cardiovascular disease: Family history of hypertension      CBC Full  -  ( 20 Mar 2019 06:51 )  WBC Count : 11.07 K/uL  Hemoglobin : 11.8 g/dL  Hematocrit : 36.5 %  Platelet Count - Automated : 190 K/uL  Mean Cell Volume : 94.1 fl  Mean Cell Hemoglobin : 30.4 pg  Mean Cell Hemoglobin Concentration : 32.3 gm/dL  Auto Neutrophil # : x  Auto Lymphocyte # : x  Auto Monocyte # : x  Auto Eosinophil # : x  Auto Basophil # : x  Auto Neutrophil % : x  Auto Lymphocyte % : x  Auto Monocyte % : x  Auto Eosinophil % : x  Auto Basophil % : x      03-20    134<L>  |  96  |  25<H>  ----------------------------<  115<H>  3.7   |  27  |  0.65    Ca    7.9<L>      20 Mar 2019 06:51  Phos  2.7     03-20  Mg     2.4     03-20    TPro  7.6  /  Alb  3.9  /  TBili  0.5  /  DBili  x   /  AST  24  /  ALT  14  /  AlkPhos  71  03-18      Urinalysis Basic - ( 18 Mar 2019 20:05 )    Color: Yellow / Appearance: Clear / SG: >=1.030 / pH: x  Gluc: x / Ketone: Trace mg/dL  / Bili: Negative / Urobili: 0.2 E.U./dL   Blood: x / Protein: 100 mg/dL / Nitrite: NEGATIVE   Leuk Esterase: NEGATIVE / RBC: < 5 /HPF / WBC < 5 /HPF   Sq Epi: x / Non Sq Epi: 0-5 /HPF / Bacteria: Present /HPF          Radiology:    < from: Xray Chest 1 View- PORTABLE-Urgent (03.19.19 @ 03:48) >  EXAM:  XR CHEST PORTABLE URGENT 1V                          PROCEDURE DATE:  03/19/2019          INTERPRETATION:  Portable chest    History: Atrial fibrillation abnormal breath sounds flow    Impression:    Unchanged compared to prior exam 3/18/2019. Scattered increased lung   markings with no focal consolidation. No pleural effusion. No   pneumothorax. Prominent size heart and degenerative changes both   shoulders again noted.                Vital Signs Last 24 Hrs  T(C): 36.8 (20 Mar 2019 08:55), Max: 37.1 (19 Mar 2019 16:07)  T(F): 98.2 (20 Mar 2019 08:55), Max: 98.8 (19 Mar 2019 16:07)  HR: 88 (20 Mar 2019 08:55) (79 - 90)  BP: 115/71 (20 Mar 2019 08:55) (115/71 - 132/71)  BP(mean): --  RR: 18 (20 Mar 2019 08:55) (18 - 19)  SpO2: 93% (20 Mar 2019 08:55) (93% - 96%)    REVIEW OF SYSTEMS:    CONSTITUTIONAL:  fatigue  EYES: No eye pain, visual disturbances, or discharge  ENMT:  No difficulty hearing, tinnitus, vertigo; No sinus or throat pain  NECK: No pain or stiffness  BREASTS: No pain, masses, or nipple discharge  RESPIRATORY: No cough, wheezing, chills or hemoptysis; No shortness of breath  CARDIOVASCULAR: No chest pain, palpitations, dizziness, or leg swelling  GASTROINTESTINAL: No abdominal or epigastric pain. No nausea, vomiting, or hematemesis; No diarrhea or constipation. No melena or hematochezia.  GENITOURINARY: No dysuria, frequency, hematuria, or incontinence  NEUROLOGICAL: No headaches, memory loss, loss of strength, numbness, or tremors  SKIN: No itching, burning, rashes, or lesions   LYMPH NODES: No enlarged glands  ENDOCRINE: No heat or cold intolerance; No hair loss  MUSCULOSKELETAL: No joint pain or swelling; No muscle, back, or extremity pain  PSYCHIATRIC: No depression, anxiety, mood swings, or difficulty sleeping  HEME/LYMPH: No easy bruising, or bleeding gums  ALLERGY AND IMMUNOLOGIC: No hives or eczema  VASCULAR: no swelling, erythema      Physical Exam: on droplet isolation, 89 yo woman lying in semi Kim's position, c/o feeling tired    Head: normocephalic, atraumatic    Eyes: PERRLA, EOMI, no nystagmus, sclera anicteric    ENT: nasal discharge, uvula midline, no oropharyngeal erythema/exudate    Neck: supple, negative JVD, negative carotid bruits, no thyromegaly    Chest: rhonchi at left base    Cardiovascular: regular rate and rhythm, neg murmurs/rubs/gallops    Abdomen: soft, non distended, non tender, negative rebound/guarding, normal bowel sounds, neg hepatosplenomegaly    Extremities: WWP, neg cyanosis/clubbing/edema, negative calf tenderness to palpation, negative Rashawn's sign    :     Neurologic Exam:    Alert and oriented to person, place, date/year, speech fluent w/o dysarthria, recent and remote memory intact, repetition intact, comprehension intact,     Cranial Nerves:     II:                       pupils equal, round and reactive to light, visual fields intact   III/ IV/VI:            extraocular movements intact, neg nystagmus, ptosis  V:                       facial sensation intact, V1-3 normal  VII:                     face symmetric, no droop, normal eye closure and smile  VIII:                    hearing intact to finger rub bilaterally  IX/ X:                 soft palate rise symmetrical  XI:                      head turning, shoulder shrug normal  XII:                     tongue midline    Motor Exam:    Upper Extremities:     RIght:    no focal weakness               negative drift    Left :     no focal weakness               negative drift    Lower Extremities:                 Right:     no focal weakness    Left:       no focal weakness      Sensory:    intact to LT/PP in all UE/LE dermatomes    DTR:            = biceps/     triceps/     brachioradialis                      = patella/   medial hamstring/ankle                      neg clonus                      neg Babinski                      neg Hoffmans    Finger to Nose:  wnl    Heel to Shin:  wnl    Rapid Alternating movements:  wnl    Joint Position Sense:  intact    Romberg:  not tested    Tandem Walking:  not tested    Gait:  not tested        PM&R Impression:    1) deconditioned  2) no focal weakness  3) influenza A/ sepsis/ PNA        Recommendations:    1) Physical therapy focusing on therapeutic exercises, bed mobility/transfer out of bed evaluation, progressive ambulation with assistive devices prn.    2) Anticipated Disposition Plan/Recs:  pending functional progress, anticipate d/c home

## 2019-03-20 NOTE — CONSULT NOTE ADULT - ASSESSMENT
Pt is a 89 y/o F hx of  Afib (on Eliquis), CAD, HTN, asthma and hypothyroid p/w cough and weakness x 2 days found to meet sepsis on admission secondary to flu A.    Problem Selector:  PROBLEM DIAGNOSES  Problem: Influenza A  Assessment and Plan: Pt presenting with T of 101.7 rectal and Leukocytosis and 58% bands. CXR without clear infiltrates. UA negative. Found to be flu A (+).  -UC w/ NGTD  -BCx2 w/ NGTD   -Tamiflu (3/18-3/22)  -supportive care: cepacol and mucinex  -Tylenol 650mg prn fever/pain  -supplemental O2 at 1-2L at this time. patient desaturated to 89% on RA. Wean off as tolerated  -incentive spirometer  -Duonebs prn  -OOB      Problem: Sepsis  Assessment and Plan: RESOLVED. Pt presenting with T of 101.7 rectal and Leukocytosis and 58% bands with possibe developing RLL infiltrate. s/p 1.45L bolus in ED. RVP (+) flu A.  Reperfusion assessment completed.     Problem: Essential hypertension  Assessment and Plan: Pt w/ hx of HTN. on enalpril 10mg PO qday  -Continue enalpril 10mg PO qday    Problem: Atrial fibrillation  Assessment and Plan: Pt w/ hx of afib. follows w/ Dr. Mosqueda. On eliquis 2.5m BID and diltiazem 300mg qday  -Continue eliquis and diltiazem    Problem: Hyponatremia  Assessment and Plan: Pt with hyponatremia of 133 on admission. Now at 132 likely to be 2/2 hypovolemia given serum osm 279, urine osm: 479, urine sodium: 20  -trend BMP  -encourage PO intake      Problem: Hypothyroid  Assessment and Plan: hx of hypothyroidism s/p thyroidectomy for thyroid ca. on synthroid 125mcg PO qday  -Continue synthroid 125mcg PO qday

## 2019-03-20 NOTE — PROGRESS NOTE ADULT - ASSESSMENT
Pt is a 89 y/o F hx of  Afib (on Eliquis), CAD, HTN, asthma and hypothyroid p/w cough and weakness x 2 days found to meet sepsis on admission secondary to flu A.

## 2019-03-20 NOTE — PHYSICAL THERAPY INITIAL EVALUATION ADULT - IMPAIRMENTS CONTRIBUTING TO GAIT DEVIATIONS, PT EVAL
impaired balance/Pt desat to 88% in room air and requires 30 seconds sitting break to recover to 92%

## 2019-03-20 NOTE — PROGRESS NOTE ADULT - PROBLEM SELECTOR PLAN 5
Pt with hyponatremia of 133 on admission. likely to be 2/2 hypovolemia given serum osm 279, urine osm: 479, urine sodium: 20  -IMROVING, 134 today  -trend BMP  -encourage PO intake

## 2019-03-20 NOTE — PROGRESS NOTE ADULT - SUBJECTIVE AND OBJECTIVE BOX
INTERVAL EVENTS:  -- SpO2 96-97% on room air and at rest (per my examination)  -- SpO2 <90% when working with PT; pt lives in apt that requires 3 steps     SUBJECTIVE:  -- states she feels better w/improving cough and dyspnea but does have SOB after conversing    MEDICATIONS:  MEDICATIONS  (STANDING):  ALBUTerol/ipratropium for Nebulization 3 milliLiter(s) Nebulizer every 4 hours  apixaban 2.5 milliGRAM(s) Oral every 12 hours  benzocaine 15 mG/menthol 3.6 mG (Sugar-Free) Lozenge 1 Lozenge Oral every 6 hours  diltiazem    milliGRAM(s) Oral daily  enalapril 10 milliGRAM(s) Oral daily  levothyroxine 125 MICROGram(s) Oral daily  oseltamivir 30 milliGRAM(s) Oral every 24 hours  potassium chloride    Tablet ER 40 milliEquivalent(s) Oral once  raloxifene 60 milliGRAM(s) Oral daily    MEDICATIONS  (PRN):  acetaminophen   Tablet .. 650 milliGRAM(s) Oral every 6 hours PRN Temp greater or equal to 38C (100.4F)  guaiFENesin/dextromethorphan  Syrup 10 milliLiter(s) Oral every 4 hours PRN Cough    Allergies: No Known Allergies    OBJECTIVE:  Vital Signs Last 24 Hrs  T(C): 36.8 (20 Mar 2019 08:55), Max: 37.1 (19 Mar 2019 16:07)  T(F): 98.2 (20 Mar 2019 08:55), Max: 98.8 (19 Mar 2019 16:07)  HR: 85 (20 Mar 2019 10:37) (67 - 90)  BP: 115/71 (20 Mar 2019 08:55) (115/71 - 132/71)  BP(mean): --  RR: 18 (20 Mar 2019 08:55) (18 - 19)  SpO2: 92% (20 Mar 2019 10:37) (92% - 97%)  I&O's Summary    PHYSICAL EXAM:  Gen: NAD, sitting upright in bed on room air, speaking in full sentences  HEENT: NCAT, MMM, clear OP  Neck: supple  CV: irregular but not tachy, no m/g/r appreciated  Pulm: transmitted upper airway sounds w/very intermittent expiratory wheeze; some cough  Abd: normoactive BS, soft, NTND  Ext: WWP, 2+ pulses x4; no c/e/e  Neuro: AOx3, CN II-XII grossly intact; nonfocal  Psych: pleasant, conversant and appropriate    LABS:                        11.8   11.07 )-----------( 190      ( 20 Mar 2019 06:51 )             36.5     03-20    134<L>  |  96  |  25<H>  ----------------------------<  115<H>  3.7   |  27  |  0.65    Ca    7.9<L>      20 Mar 2019 06:51  Phos  2.7     03-20  Mg     2.4     03-20    MICRODATA:  -- Culture - Urine (collected 18 Mar 2019 22:40)  Source: .Urine None  Final Report (20 Mar 2019 11:38):    No growth    -- Culture - Blood (collected 18 Mar 2019 17:17)  Source: .Blood Blood  Preliminary Report (19 Mar 2019 18:00):    No growth at 1 day.    -- Culture - Blood (collected 18 Mar 2019 17:17)  Source: .Blood Blood  Preliminary Report (19 Mar 2019 18:00):    No growth at 1 day.    RADIOLOGY/OTHER STUDIES:  -- No new imaging.

## 2019-03-20 NOTE — PROGRESS NOTE ADULT - PROBLEM SELECTOR PLAN 6
hx of hypothyroidism s/p thyroidectomy for thyroid ca. on synthroid 125mcg PO qday  -Continue synthroid 125mcg PO qd

## 2019-03-20 NOTE — PROGRESS NOTE ADULT - PROBLEM SELECTOR PLAN 3
Pt w/ hx of afib. follows w/ Dr. Mosqueda. On eliquis 2.5m BID and diltiazem 300mg qday  -Continue eliquis and diltiazem

## 2019-03-20 NOTE — PHYSICAL THERAPY INITIAL EVALUATION ADULT - GAIT DEVIATIONS NOTED, PT EVAL
decreased damian/increased time in double stance/decreased step length/fairly steady gait, no lose of balance, decreased heel strike and hip flexion bilaterally.

## 2019-03-20 NOTE — PROGRESS NOTE ADULT - PROBLEM SELECTOR PLAN 4
Pt w/ hx of HTN. on enalapril 10mg PO qday  -Continue enalapril 10mg PO qday    #osteopenia  Pt with a hx of osteopenia, on raloxifene at home  -c/w home raloxifene

## 2019-03-21 ENCOUNTER — TRANSCRIPTION ENCOUNTER (OUTPATIENT)
Age: 84
End: 2019-03-21

## 2019-03-21 VITALS — WEIGHT: 103.62 LBS

## 2019-03-21 LAB
ANION GAP SERPL CALC-SCNC: 7 MMOL/L — SIGNIFICANT CHANGE UP (ref 5–17)
BUN SERPL-MCNC: 26 MG/DL — HIGH (ref 7–23)
CALCIUM SERPL-MCNC: 8.3 MG/DL — LOW (ref 8.4–10.5)
CHLORIDE SERPL-SCNC: 99 MMOL/L — SIGNIFICANT CHANGE UP (ref 96–108)
CO2 SERPL-SCNC: 29 MMOL/L — SIGNIFICANT CHANGE UP (ref 22–31)
CREAT SERPL-MCNC: 0.61 MG/DL — SIGNIFICANT CHANGE UP (ref 0.5–1.3)
GLUCOSE SERPL-MCNC: 103 MG/DL — HIGH (ref 70–99)
HCT VFR BLD CALC: 36.6 % — SIGNIFICANT CHANGE UP (ref 34.5–45)
HGB BLD-MCNC: 11.7 G/DL — SIGNIFICANT CHANGE UP (ref 11.5–15.5)
MAGNESIUM SERPL-MCNC: 2.2 MG/DL — SIGNIFICANT CHANGE UP (ref 1.6–2.6)
MCHC RBC-ENTMCNC: 30.6 PG — SIGNIFICANT CHANGE UP (ref 27–34)
MCHC RBC-ENTMCNC: 32 GM/DL — SIGNIFICANT CHANGE UP (ref 32–36)
MCV RBC AUTO: 95.8 FL — SIGNIFICANT CHANGE UP (ref 80–100)
NRBC # BLD: 0 /100 WBCS — SIGNIFICANT CHANGE UP (ref 0–0)
PLATELET # BLD AUTO: 195 K/UL — SIGNIFICANT CHANGE UP (ref 150–400)
POTASSIUM SERPL-MCNC: 4.8 MMOL/L — SIGNIFICANT CHANGE UP (ref 3.5–5.3)
POTASSIUM SERPL-SCNC: 4.8 MMOL/L — SIGNIFICANT CHANGE UP (ref 3.5–5.3)
RBC # BLD: 3.82 M/UL — SIGNIFICANT CHANGE UP (ref 3.8–5.2)
RBC # FLD: 14.9 % — HIGH (ref 10.3–14.5)
SODIUM SERPL-SCNC: 135 MMOL/L — SIGNIFICANT CHANGE UP (ref 135–145)
WBC # BLD: 7.87 K/UL — SIGNIFICANT CHANGE UP (ref 3.8–10.5)
WBC # FLD AUTO: 7.87 K/UL — SIGNIFICANT CHANGE UP (ref 3.8–10.5)

## 2019-03-21 PROCEDURE — 93005 ELECTROCARDIOGRAM TRACING: CPT

## 2019-03-21 PROCEDURE — 99285 EMERGENCY DEPT VISIT HI MDM: CPT | Mod: 25

## 2019-03-21 PROCEDURE — 87086 URINE CULTURE/COLONY COUNT: CPT

## 2019-03-21 PROCEDURE — 36415 COLL VENOUS BLD VENIPUNCTURE: CPT

## 2019-03-21 PROCEDURE — 87798 DETECT AGENT NOS DNA AMP: CPT

## 2019-03-21 PROCEDURE — 84300 ASSAY OF URINE SODIUM: CPT

## 2019-03-21 PROCEDURE — 83935 ASSAY OF URINE OSMOLALITY: CPT

## 2019-03-21 PROCEDURE — 80048 BASIC METABOLIC PNL TOTAL CA: CPT

## 2019-03-21 PROCEDURE — 71046 X-RAY EXAM CHEST 2 VIEWS: CPT

## 2019-03-21 PROCEDURE — 94640 AIRWAY INHALATION TREATMENT: CPT

## 2019-03-21 PROCEDURE — 71045 X-RAY EXAM CHEST 1 VIEW: CPT

## 2019-03-21 PROCEDURE — 84484 ASSAY OF TROPONIN QUANT: CPT

## 2019-03-21 PROCEDURE — 84100 ASSAY OF PHOSPHORUS: CPT

## 2019-03-21 PROCEDURE — 99239 HOSP IP/OBS DSCHRG MGMT >30: CPT

## 2019-03-21 PROCEDURE — 87581 M.PNEUMON DNA AMP PROBE: CPT

## 2019-03-21 PROCEDURE — 83605 ASSAY OF LACTIC ACID: CPT

## 2019-03-21 PROCEDURE — 85610 PROTHROMBIN TIME: CPT

## 2019-03-21 PROCEDURE — 85730 THROMBOPLASTIN TIME PARTIAL: CPT

## 2019-03-21 PROCEDURE — 81001 URINALYSIS AUTO W/SCOPE: CPT

## 2019-03-21 PROCEDURE — 85025 COMPLETE CBC W/AUTO DIFF WBC: CPT

## 2019-03-21 PROCEDURE — 85027 COMPLETE CBC AUTOMATED: CPT

## 2019-03-21 PROCEDURE — 97162 PT EVAL MOD COMPLEX 30 MIN: CPT

## 2019-03-21 PROCEDURE — 80053 COMPREHEN METABOLIC PANEL: CPT

## 2019-03-21 PROCEDURE — 87040 BLOOD CULTURE FOR BACTERIA: CPT

## 2019-03-21 PROCEDURE — 87449 NOS EACH ORGANISM AG IA: CPT

## 2019-03-21 PROCEDURE — 83930 ASSAY OF BLOOD OSMOLALITY: CPT

## 2019-03-21 PROCEDURE — 87486 CHLMYD PNEUM DNA AMP PROBE: CPT

## 2019-03-21 PROCEDURE — 83735 ASSAY OF MAGNESIUM: CPT

## 2019-03-21 PROCEDURE — 87633 RESP VIRUS 12-25 TARGETS: CPT

## 2019-03-21 RX ORDER — LEVOTHYROXINE SODIUM 125 MCG
1 TABLET ORAL
Qty: 0 | Refills: 0 | COMMUNITY

## 2019-03-21 RX ORDER — RALOXIFENE HYDROCHLORIDE 60 MG/1
1 TABLET, COATED ORAL
Qty: 0 | Refills: 0 | COMMUNITY

## 2019-03-21 RX ORDER — DILTIAZEM HCL 120 MG
1 CAPSULE, EXT RELEASE 24 HR ORAL
Qty: 0 | Refills: 0 | COMMUNITY

## 2019-03-21 RX ORDER — UMECLIDINIUM BROMIDE AND VILANTEROL TRIFENATATE 62.5; 25 UG/1; UG/1
1 POWDER RESPIRATORY (INHALATION)
Qty: 0 | Refills: 0 | COMMUNITY

## 2019-03-21 RX ORDER — APIXABAN 2.5 MG/1
1 TABLET, FILM COATED ORAL
Qty: 0 | Refills: 0 | COMMUNITY

## 2019-03-21 RX ADMIN — Medication 30 MILLIGRAM(S): at 00:14

## 2019-03-21 RX ADMIN — Medication 125 MICROGRAM(S): at 08:49

## 2019-03-21 RX ADMIN — BENZOCAINE AND MENTHOL 1 LOZENGE: 5; 1 LIQUID ORAL at 13:31

## 2019-03-21 RX ADMIN — Medication 300 MILLIGRAM(S): at 08:50

## 2019-03-21 RX ADMIN — BENZOCAINE AND MENTHOL 1 LOZENGE: 5; 1 LIQUID ORAL at 08:49

## 2019-03-21 RX ADMIN — Medication 3 MILLILITER(S): at 13:30

## 2019-03-21 RX ADMIN — Medication 3 MILLILITER(S): at 08:49

## 2019-03-21 RX ADMIN — RALOXIFENE HYDROCHLORIDE 60 MILLIGRAM(S): 60 TABLET, COATED ORAL at 13:31

## 2019-03-21 RX ADMIN — APIXABAN 2.5 MILLIGRAM(S): 2.5 TABLET, FILM COATED ORAL at 08:49

## 2019-03-21 RX ADMIN — Medication 10 MILLIGRAM(S): at 08:50

## 2019-03-21 RX ADMIN — Medication 3 MILLILITER(S): at 00:21

## 2019-03-21 RX ADMIN — BENZOCAINE AND MENTHOL 1 LOZENGE: 5; 1 LIQUID ORAL at 00:21

## 2019-03-21 NOTE — DISCHARGE NOTE PROVIDER - NSDCCPCAREPLAN_GEN_ALL_CORE_FT
PRINCIPAL DISCHARGE DIAGNOSIS  Diagnosis: Influenza A  Assessment and Plan of Treatment: You came to the hospital with weakness and a cough, and you were found to be septic because you had the flu. We treated you with tamiflu, as well as nebulizers and oxygen, and you improved. Please continue to take tamiflu for 2 more days, and follow up with your primary doctor.      SECONDARY DISCHARGE DIAGNOSES  Diagnosis: Osteopenia  Assessment and Plan of Treatment: Continue your home raloxifene and follow up with your primary doctor.    Diagnosis: Atrial fibrillation  Assessment and Plan of Treatment: Continue your home eliqiuis and diltizem and follow up with your primary doctor.    Diagnosis: Hyponatremia  Assessment and Plan of Treatment: Your sodium was slightly low during your hospital stay. This could have been from dehydration as well as your overall infection. Please stay hydrated and follow up with your primary doctor.    Diagnosis: Essential hypertension  Assessment and Plan of Treatment: Continue your home enalapril and follow up with your primary doctor.    Diagnosis: Hypothyroid  Assessment and Plan of Treatment: Continue your home synthroid and follow up with your primary doctor.

## 2019-03-21 NOTE — PROGRESS NOTE ADULT - ASSESSMENT
Briefly, this is a jennifer 89yo woman with a PMH of HTN, AFib (on Eliquis), CAD, asthma and thyroid CA s/p thyroidectomy c/b hypothyroidism who p/w intermittently productive cough and weakness x2 days and subsequently found to have sepsis secondary to flu A.  Clinically continues to improve today.  SpO2 within goal; even though SpO2 with exertion is 90% at times, there is radiographic evidence of COPD that makes this acceptable for discharge. She is aware of signs/symptoms that should prompted a medical re-evaluation.  Will need to coordinate with SW a safe dispo and aim for discharge this afternoon.    -- c/w Tamiflu x 5 days total (day 4) --> should have PMD follow-up within reasonable to time for re-eval  -- c/w nebs q4h --> can switch to PRN  -- encourage IS   -- wean O2 as able  -- c/w home Diltiazem   -- c/w home Enalapril  -- c/w home Levothyroxine   -- c/w home Raloxifene  -- DVT PPx - Apixiban  -- Dispo - medically cleared for discharge today    Radha Wilkinson  [phone]

## 2019-03-21 NOTE — DIETITIAN INITIAL EVALUATION ADULT. - ENERGY NEEDS
ABW used for calculations as pt between % of IBW.   ABW 47kg, IBW 50kg, 94% IBW, ht 62", BMI 19   Nutrient needs based on [facility] standards of care for maintenance in older adults, adjusted for flu fluid per team

## 2019-03-21 NOTE — DIETITIAN INITIAL EVALUATION ADULT. - OTHER INFO
89F hx of  Afib (on Eliquis), CAD, HTN, asthma and hypothyroid p/w cough and weakness x 2 days. Pt was in her usual state of health on Saturday (3/16/19) and woke up the next morning with cough , fatigue, and chills. Pt states she has intermittent productive cough with yellow sputum. Pt also reports feeling weak and having decreased appetite. Presents with sepsis/ flu, sepsis now resolved, pending completion of tamiflu prior to DC. No noted n/v/d/c, chewing/ swallowing issues or pain impacting intake, skin is intact. NKFA or changes in wt noted. Will follow per protocol.

## 2019-03-21 NOTE — PROGRESS NOTE ADULT - SUBJECTIVE AND OBJECTIVE BOX
INTERVAL EVENTS:  -- NAEO  -- SpO >95% on RA w/o exertion; SpO >90% with exertion     SUBJECTIVE:  -- feels much better, no complaints, requesting to go home  -- has been practicing with spirometer    MEDICATIONS:  MEDICATIONS  (STANDING):  ALBUTerol/ipratropium for Nebulization 3 milliLiter(s) Nebulizer every 4 hours  apixaban 2.5 milliGRAM(s) Oral every 12 hours  benzocaine 15 mG/menthol 3.6 mG (Sugar-Free) Lozenge 1 Lozenge Oral every 6 hours  diltiazem    milliGRAM(s) Oral daily  enalapril 10 milliGRAM(s) Oral daily  levothyroxine 125 MICROGram(s) Oral daily  oseltamivir 30 milliGRAM(s) Oral every 24 hours  raloxifene 60 milliGRAM(s) Oral daily    MEDICATIONS  (PRN):  acetaminophen   Tablet .. 650 milliGRAM(s) Oral every 6 hours PRN Temp greater or equal to 38C (100.4F)  guaiFENesin/dextromethorphan  Syrup 10 milliLiter(s) Oral every 4 hours PRN Cough    Allergies  -- No Known Allergies    OBJECTIVE:  Vital Signs Last 24 Hrs  T(C): 36.5 (21 Mar 2019 08:51), Max: 36.6 (20 Mar 2019 15:44)  T(F): 97.7 (21 Mar 2019 08:51), Max: 97.9 (20 Mar 2019 15:44)  HR: 81 (21 Mar 2019 08:51) (78 - 81)  BP: 138/62 (21 Mar 2019 08:51) (112/64 - 138/62)  BP(mean): --  RR: 18 (21 Mar 2019 08:51) (17 - 18)  SpO2: 94% (21 Mar 2019 08:51) (94% - 97%)  I&O's Summary      PHYSICAL EXAM:  Gen: NAD, sitting upright in bed on room air, speaking in full sentences  HEENT: NCAT, MMM, clear OP  CV: irregular but not tachy, no m/g/r appreciated  Pulm: CTA B, less cough today  Abd: normoactive BS, soft, NTND  Ext: WWP, 2+ pulses x4; no c/e/e  Neuro: AOx3, CN II-XII grossly intact; nonfocal  Psych: pleasant, conversant and appropriate    LABS:                        11.7   7.87  )-----------( 195      ( 21 Mar 2019 06:45 )             36.6     03-21    135  |  99  |  26<H>  ----------------------------<  103<H>  4.8   |  29  |  0.61    Ca    8.3<L>      21 Mar 2019 06:45  Phos  2.7     03-20  Mg     2.2     03-21    MICRODATA:  -- Culture - Urine (collected 18 Mar 2019 22:40)  Source: .Urine None  Final Report (20 Mar 2019 11:38):    No growth    -- Culture - Blood (collected 18 Mar 2019 17:17)  Source: .Blood Blood  Preliminary Report (20 Mar 2019 18:00):    No growth at 2 days.    -- Culture - Blood (collected 18 Mar 2019 17:17)  Source: .Blood Blood  Preliminary Report (20 Mar 2019 18:00):    No growth at 2 days.    RADIOLOGY/OTHER STUDIES:  -- No new imaging.

## 2019-03-21 NOTE — PROGRESS NOTE ADULT - SUBJECTIVE AND OBJECTIVE BOX
Physical Medicine and Rehabilitation Progress Note:    Patient is a 89y old  Female who presents with a chief complaint of Cough and weakness (21 Mar 2019 13:52)      HPI:  Pt is a 89 y/o F hx of  Afib (on Eliquis), CAD, HTN, asthma and hypothyroid p/w cough and weakness x 2 days. Pt was in her usual state of health on Saturday (316/19) and woke up the next morning with cough , fatigue, and chills. Pt states she has intermittent productive cough with yellow sputum. Pt also reports feeling weak and having decreased appetite. She denies any incident of falling. Pt denies any sick contacts or recent travel history. Reports that she has received the pneumonia vaccine and flu shot this year. Pt endorses mild shortness of breathe but denies any CP, Palpitations, N/V, Diarrhea, urinary frequency, dysuria, abdominal pain, melena, hematochezia, or hemoptysis.     In the ED vitals: rectal T 101.7, HR 82, /65, RR 18, 96% on supplemental O2. CXR with possible haziness on RLL but pending official read. Pt received 1.45L NS bolus, tylenol and Levaquin. Pt was admitted to [facility] due to sepsis 2/2 suspected PNA. (18 Mar 2019 17:37)                            11.7   7.87  )-----------( 195      ( 21 Mar 2019 06:45 )             36.6       03-21    135  |  99  |  26<H>  ----------------------------<  103<H>  4.8   |  29  |  0.61    Ca    8.3<L>      21 Mar 2019 06:45  Phos  2.7     03-20  Mg     2.2     03-21      Vital Signs Last 24 Hrs  T(C): 36.5 (21 Mar 2019 08:51), Max: 36.6 (21 Mar 2019 05:09)  T(F): 97.7 (21 Mar 2019 08:51), Max: 97.9 (21 Mar 2019 05:09)  HR: 81 (21 Mar 2019 08:51) (78 - 81)  BP: 138/62 (21 Mar 2019 08:51) (112/64 - 138/62)  BP(mean): --  RR: 18 (21 Mar 2019 08:51) (18 - 18)  SpO2: 94% (21 Mar 2019 08:51) (94% - 95%)    MEDICATIONS  (STANDING):  ALBUTerol/ipratropium for Nebulization 3 milliLiter(s) Nebulizer every 4 hours  apixaban 2.5 milliGRAM(s) Oral every 12 hours  benzocaine 15 mG/menthol 3.6 mG (Sugar-Free) Lozenge 1 Lozenge Oral every 6 hours  diltiazem    milliGRAM(s) Oral daily  enalapril 10 milliGRAM(s) Oral daily  levothyroxine 125 MICROGram(s) Oral daily  oseltamivir 30 milliGRAM(s) Oral every 24 hours  raloxifene 60 milliGRAM(s) Oral daily    MEDICATIONS  (PRN):  acetaminophen   Tablet .. 650 milliGRAM(s) Oral every 6 hours PRN Temp greater or equal to 38C (100.4F)  guaiFENesin/dextromethorphan  Syrup 10 milliLiter(s) Oral every 4 hours PRN Cough    Currently Undergoing Physical Therapy at bedside.    Functional Status Assessment:      Previous Level of Function:     · Ambulation Skills	independent	  · Transfer Skills	independent	  · ADL Skills	independent	  · Work/Leisure Activity	independent	  · Additional Comments	Pt lives alone in an apartment with elevator, 3 steps walk into the building. Pt has  comes very 2 weeks to clean the house. Prior to admission, pt ambulated independently without assistive device, Pt has a straight cane at home. Pt denies recent fall.	    Cognitive Status Examination:   · Orientation	oriented to person, place, time and situation	  · Level of Consciousness	alert	  · Follows Commands and Answers Questions	100% of the time	  · Personal Safety and Judgment	intact	    Range of Motion Exam:   · Active Range of Motion Examination	AROM WFL through out	    Manual Muscle Testing:   · Manual Muscle Testing Results	b/l UEs >3+/5 grossly, b/l LEs >3+/5 grossly	    Bed Mobility: Scooting/Bridging:     · Level of [place]	independent	    Bed Mobility: Sit to Supine:     · Level of [place]	independent	    Bed Mobility: Supine to Sit:     · Level of [place]	independent	    Transfer: Sit to Stand:     · Level of [place]	independent	  · Weight-Bearing Restrictions	weight-bearing as tolerated	  · Assistive Device	none	    Transfer: Stand to Sit:     · Level of [place]	independent	  · Weight-Bearing Restrictions	weight-bearing as tolerated	  · Assistive Device	none	    Gait Skills:     · Level of [place]	close supervision/supervision	  · Physical Assist/Nonphysical Assist	supervision	  · Weight-Bearing Restrictions	weight-bearing as tolerated	  · Assistive Device	none	  · Gait Distance	150 feet	    Gait Analysis:     · Gait Pattern Used	swing-to gait	  · Gait Deviations Noted	decreased damian; increased time in double stance; decreased step length; fairly steady gait, no lose of balance, decreased heel strike and hip flexion bilaterally.	  · Impairments Contributing to Gait Deviations	impaired balance; Pt desat to 88% in room air and requires 30 seconds sitting break to recover to 92%	    Stair Negotiation:     · Level of [place]	supervision	  · Physical Assist/Nonphysical Assist	supervision	  · Weight-Bearing Restrictions	weight-bearing as tolerated	  · Assistive Device	right rail up; left rail down	  · Stair Pattern	step to step	  · Number of Stairs	3	    Balance Skills Assessment:     · Sitting Balance: Static	good balance	  · Sitting Balance: Dynamic	good balance	  · Sit-to-Stand Balance	good balance	  · Standing Balance: Static	good minus	  · Standing Balance: Dynamic	fair plus	    Clinical Impressions:   · Criteria for Skilled Therapeutic Interventions	impairments found; functional limitations in following categories; rehab potential; therapy frequency; anticipated discharge recommendation	  · Impairments Found (describe specific impairments)	aerobic capacity/endurance; gross motor; gait, locomotion, and balance; muscle strength	  · Functional Limitations in Following Categories (describe specific limitations)	self-care; home management; community/leisure	  · Rehab Potential	good, to achieve stated therapy goals	  · Therapy Frequency	2-3x/week	        PM&R Impression: as above    Disposition Plan Recommendations: d/c home with no post discharge rehab needs

## 2019-03-21 NOTE — DISCHARGE NOTE PROVIDER - HOSPITAL COURSE
Pt is a 89 y/o F hx of  Afib (on Eliquis), CAD, HTN, asthma and hypothyroid p/w cough and weakness x 2 days. Pt presented with T of 101.7 rectal and Leukocytosis and 58% bands. CXR without clear infiltrates. UA negative. Blood cultures and urine cultures negative. Found to be flu A (+). Treated with tamiflu, for a 5 day course, and was also given dounebs and incentive spirometer. During hopsital stay pt required supplemental O2 via nasal cannula. On the day of discharge pt was able to ambulate on room air, saturating at 90%. Pt hemodynamically stable for discharge home with 2 more days of tamiflu and continuation of home medications.

## 2019-03-21 NOTE — DISCHARGE NOTE NURSING/CASE MANAGEMENT/SOCIAL WORK - NSDCDPATPORTLINK_GEN_ALL_CORE
You can access the [email or web address] Patient Portal, offered by [facility], by registering with the following website: [email or web address]

## 2019-03-21 NOTE — PROGRESS NOTE ADULT - ASSESSMENT
Pt is a 89 y/o F hx of  Afib (on Eliquis), CAD, HTN, asthma and hypothyroid p/w cough and weakness x 2 days found to meet sepsis on admission secondary to flu A.    Problem/Plan - 1:  ·  Problem: Influenza A.  Plan: Pt presenting with T of 101.7 rectal and Leukocytosis and 58% bands. CXR without clear infiltrates. UA negative. Found to be flu A (+).  -UC w/ NGTD  -BCx2 w/ NGTD   -Tamiflu (3/18-3/22)  -supportive care: cepacol, robitussin  -Tylenol 650mg prn fever/pain  -supplemental O2 at 1-2L at this time. patient desaturated to 88% on RA while ambulating, 92% on RA at rest. Wean off as tolerated  -incentive spirometer  -Duonebs q4.     Problem/Plan - 2:  ·  Problem: Sepsis.  Plan: RESOLVED. Pt presenting with T of 101.7 rectal and Leukocytosis and 58% bands with possible developing RLL infiltrate. s/p 1.45L bolus in ED. RVP (+) flu A.  Reperfusion assessment completed.     Problem/Plan - 3:  ·  Problem: Atrial fibrillation.  Plan: Pt w/ hx of afib. follows w/ Dr. Mosqueda. On eliquis 2.5m BID and diltiazem 300mg qday  -Continue eliquis and diltiazem.     Problem/Plan - 4:  ·  Problem: Essential hypertension.  Plan: Pt w/ hx of HTN. on enalapril 10mg PO qday  -Continue enalapril 10mg PO qday    #osteopenia  Pt with a hx of osteopenia, on raloxifene at home  -c/w home raloxifene.     Problem/Plan - 5:  ·  Problem: Hyponatremia.  Plan: Pt with hyponatremia of 133 on admission. likely to be 2/2 hypovolemia given serum osm 279, urine osm: 479, urine sodium: 20  -IMROVING, 134 today  -trend BMP  -encourage PO intake.     Problem/Plan - 6:  Problem: Hypothyroid. Plan: hx of hypothyroidism s/p thyroidectomy for thyroid ca. on synthroid 125mcg PO qday  -Continue synthroid 125mcg PO qd.

## 2019-03-23 LAB
CULTURE RESULTS: SIGNIFICANT CHANGE UP
CULTURE RESULTS: SIGNIFICANT CHANGE UP
SPECIMEN SOURCE: SIGNIFICANT CHANGE UP
SPECIMEN SOURCE: SIGNIFICANT CHANGE UP

## 2019-03-25 DIAGNOSIS — J10.1 INFLUENZA DUE TO OTHER IDENTIFIED INFLUENZA VIRUS WITH OTHER RESPIRATORY MANIFESTATIONS: ICD-10-CM

## 2019-03-25 DIAGNOSIS — A41.9 SEPSIS, UNSPECIFIED ORGANISM: ICD-10-CM

## 2019-03-25 DIAGNOSIS — Z96.649 PRESENCE OF UNSPECIFIED ARTIFICIAL HIP JOINT: ICD-10-CM

## 2019-03-25 DIAGNOSIS — Z82.49 FAMILY HISTORY OF ISCHEMIC HEART DISEASE AND OTHER DISEASES OF THE CIRCULATORY SYSTEM: ICD-10-CM

## 2019-03-25 DIAGNOSIS — J45.909 UNSPECIFIED ASTHMA, UNCOMPLICATED: ICD-10-CM

## 2019-03-25 DIAGNOSIS — R09.02 HYPOXEMIA: ICD-10-CM

## 2019-03-25 DIAGNOSIS — I10 ESSENTIAL (PRIMARY) HYPERTENSION: ICD-10-CM

## 2019-03-25 DIAGNOSIS — R63.6 UNDERWEIGHT: ICD-10-CM

## 2019-03-25 DIAGNOSIS — E87.1 HYPO-OSMOLALITY AND HYPONATREMIA: ICD-10-CM

## 2019-03-25 DIAGNOSIS — Z85.850 PERSONAL HISTORY OF MALIGNANT NEOPLASM OF THYROID: ICD-10-CM

## 2019-03-25 DIAGNOSIS — E89.0 POSTPROCEDURAL HYPOTHYROIDISM: ICD-10-CM

## 2019-03-25 DIAGNOSIS — A41.89 OTHER SPECIFIED SEPSIS: ICD-10-CM

## 2019-03-25 DIAGNOSIS — I25.10 ATHEROSCLEROTIC HEART DISEASE OF NATIVE CORONARY ARTERY WITHOUT ANGINA PECTORIS: ICD-10-CM

## 2019-03-25 DIAGNOSIS — M85.80 OTHER SPECIFIED DISORDERS OF BONE DENSITY AND STRUCTURE, UNSPECIFIED SITE: ICD-10-CM

## 2019-03-25 DIAGNOSIS — I48.91 UNSPECIFIED ATRIAL FIBRILLATION: ICD-10-CM

## 2019-03-25 DIAGNOSIS — E86.0 DEHYDRATION: ICD-10-CM

## 2019-03-25 DIAGNOSIS — Z79.01 LONG TERM (CURRENT) USE OF ANTICOAGULANTS: ICD-10-CM

## 2019-03-25 RX ORDER — APIXABAN 2.5 MG/1
2.5 TABLET, FILM COATED ORAL
Qty: 60 | Refills: 2 | Status: DISCONTINUED | COMMUNITY
Start: 2017-01-25 | End: 2019-03-25

## 2022-01-13 NOTE — ED ADULT NURSE NOTE - NS ED NURSE RECORD ANOTHER VITAL SIGN
[JVD] : no jugular venous distention 
[Normal Heart Sounds] : normal heart sounds
[Normal Rate and Rhythm] : normal rate and rhythm
[2+] : left 2+
[Ankle Swelling (On Exam)] : not present
[Abdomen Masses] : No abdominal masses
[Alert] : alert
[Oriented to Person] : oriented to person
[Oriented to Place] : oriented to place
[Calm] : calm
[de-identified] : AAO x 3, NAD
[de-identified] : Well healed left neck incision. Sutures removed. No erythema, drainage or tenderness
Yes
no

## 2022-05-21 NOTE — H&P ADULT - CLICK TO LAUNCH ORM
. FAMILY HISTORY:  Father  Still living? Unknown  Family history of bladder cancer, Age at diagnosis: Age Unknown    Mother  Still living? Unknown  Family history of lung cancer, Age at diagnosis: Age Unknown    Sibling  Still living? Unknown  Family history of myelofibrosis, Age at diagnosis: Age Unknown